# Patient Record
Sex: MALE | Race: WHITE | Employment: OTHER | URBAN - METROPOLITAN AREA
[De-identification: names, ages, dates, MRNs, and addresses within clinical notes are randomized per-mention and may not be internally consistent; named-entity substitution may affect disease eponyms.]

---

## 2017-01-13 ENCOUNTER — ANESTHESIA (OUTPATIENT)
Dept: ENDOSCOPY | Age: 77
End: 2017-01-13
Payer: MEDICARE

## 2017-01-13 ENCOUNTER — SURGERY (OUTPATIENT)
Age: 77
End: 2017-01-13

## 2017-01-13 ENCOUNTER — ANESTHESIA EVENT (OUTPATIENT)
Dept: ENDOSCOPY | Age: 77
End: 2017-01-13
Payer: MEDICARE

## 2017-01-13 ENCOUNTER — HOSPITAL ENCOUNTER (OUTPATIENT)
Age: 77
Setting detail: OUTPATIENT SURGERY
Discharge: HOME | End: 2017-01-13
Attending: INTERNAL MEDICINE | Admitting: INTERNAL MEDICINE
Payer: MEDICARE

## 2017-01-13 VITALS
DIASTOLIC BLOOD PRESSURE: 96 MMHG | SYSTOLIC BLOOD PRESSURE: 144 MMHG | OXYGEN SATURATION: 97 % | HEART RATE: 75 BPM | HEIGHT: 71 IN | TEMPERATURE: 97.6 F | BODY MASS INDEX: 27.58 KG/M2 | WEIGHT: 197 LBS | RESPIRATION RATE: 17 BRPM

## 2017-01-13 LAB
GLUCOSE BLD STRIP.AUTO-MCNC: 147 MG/DL (ref 65–100)
H PYLORI FROM TISSUE: NEGATIVE
KIT LOT NO., HCLOLOT: NORMAL
NEGATIVE CONTROL: NEGATIVE
POSITIVE CONTROL: POSITIVE
SERVICE CMNT-IMP: ABNORMAL

## 2017-01-13 PROCEDURE — 88305 TISSUE EXAM BY PATHOLOGIST: CPT | Performed by: INTERNAL MEDICINE

## 2017-01-13 PROCEDURE — 87077 CULTURE AEROBIC IDENTIFY: CPT | Performed by: INTERNAL MEDICINE

## 2017-01-13 PROCEDURE — 74011250636 HC RX REV CODE- 250/636: Performed by: INTERNAL MEDICINE

## 2017-01-13 PROCEDURE — 74011250636 HC RX REV CODE- 250/636

## 2017-01-13 PROCEDURE — 74011000250 HC RX REV CODE- 250

## 2017-01-13 PROCEDURE — 76060000031 HC ANESTHESIA FIRST 0.5 HR: Performed by: INTERNAL MEDICINE

## 2017-01-13 PROCEDURE — 82962 GLUCOSE BLOOD TEST: CPT

## 2017-01-13 PROCEDURE — 77030019988 HC FCPS ENDOSC DISP BSC -B: Performed by: INTERNAL MEDICINE

## 2017-01-13 PROCEDURE — 76040000019: Performed by: INTERNAL MEDICINE

## 2017-01-13 RX ORDER — LIDOCAINE HYDROCHLORIDE 20 MG/ML
INJECTION, SOLUTION EPIDURAL; INFILTRATION; INTRACAUDAL; PERINEURAL AS NEEDED
Status: DISCONTINUED | OUTPATIENT
Start: 2017-01-13 | End: 2017-01-13 | Stop reason: HOSPADM

## 2017-01-13 RX ORDER — SODIUM CHLORIDE 0.9 % (FLUSH) 0.9 %
5-10 SYRINGE (ML) INJECTION EVERY 8 HOURS
Status: DISCONTINUED | OUTPATIENT
Start: 2017-01-13 | End: 2017-01-13 | Stop reason: HOSPADM

## 2017-01-13 RX ORDER — SODIUM CHLORIDE 0.9 % (FLUSH) 0.9 %
5-10 SYRINGE (ML) INJECTION AS NEEDED
Status: DISCONTINUED | OUTPATIENT
Start: 2017-01-13 | End: 2017-01-13 | Stop reason: HOSPADM

## 2017-01-13 RX ORDER — SODIUM CHLORIDE 9 MG/ML
50 INJECTION, SOLUTION INTRAVENOUS CONTINUOUS
Status: DISCONTINUED | OUTPATIENT
Start: 2017-01-13 | End: 2017-01-13 | Stop reason: HOSPADM

## 2017-01-13 RX ORDER — GLYCOPYRROLATE 0.2 MG/ML
INJECTION INTRAMUSCULAR; INTRAVENOUS AS NEEDED
Status: DISCONTINUED | OUTPATIENT
Start: 2017-01-13 | End: 2017-01-13 | Stop reason: HOSPADM

## 2017-01-13 RX ORDER — ACETAMINOPHEN 325 MG/1
TABLET ORAL
COMMUNITY

## 2017-01-13 RX ORDER — PROPOFOL 10 MG/ML
INJECTION, EMULSION INTRAVENOUS AS NEEDED
Status: DISCONTINUED | OUTPATIENT
Start: 2017-01-13 | End: 2017-01-13 | Stop reason: HOSPADM

## 2017-01-13 RX ADMIN — SODIUM CHLORIDE 50 ML/HR: 900 INJECTION, SOLUTION INTRAVENOUS at 08:40

## 2017-01-13 RX ADMIN — GLYCOPYRROLATE 0.2 MG: 0.2 INJECTION INTRAMUSCULAR; INTRAVENOUS at 09:14

## 2017-01-13 RX ADMIN — LIDOCAINE HYDROCHLORIDE 40 MG: 20 INJECTION, SOLUTION EPIDURAL; INFILTRATION; INTRACAUDAL; PERINEURAL at 09:18

## 2017-01-13 RX ADMIN — PROPOFOL 160 MG: 10 INJECTION, EMULSION INTRAVENOUS at 09:18

## 2017-01-13 NOTE — H&P
Gastroenterology History and Physical    Patient: Oma Evans    Physician: Meri Barrett MD    Vital Signs: Blood pressure (!) 146/96, pulse 67, temperature 97.5 °F (36.4 °C), resp. rate 18, height 5' 10.5\" (1.791 m), weight 89.4 kg (197 lb), SpO2 99 %. Allergies: No Known Allergies    Indication: gerd    History:  Past Medical History   Diagnosis Date    Cancer ([address]      melanoma shoulder right,basal cell cancer nose    Diabetes (HCC)     GERD (gastroesophageal reflux disease)     HTN (hypertension)     Hyperlipemia     Ill-defined condition      insomnia    Sleep apnea      cpap      Past Surgical History   Procedure Laterality Date    Pr hand/finger surgery unlisted      Pr laser surgery of eye      Hx other surgical       melonama removed 6 years ago    Hx other surgical       basal skin cancer    Hx orthopaedic       trigger finger left      Social History     Social History    Marital status:      Spouse name: N/A    Number of children: N/A    Years of education: N/A     Social History Main Topics    Smoking status: Never Smoker    Smokeless tobacco: None    Alcohol use 1.8 oz/week     3 Glasses of wine per week      Comment: moderately    Drug use: No    Sexual activity: Not Asked     Other Topics Concern    None     Social History Narrative      Family History   Problem Relation Age of Onset   [place] [place] Cancer Mother     Diabetes Maternal Grandfather     Diabetes Sister     Kidney Disease Paternal Grandmother        Medications:   Prior to Admission medications    Medication Sig Start Date End Date Taking? Authorizing Provider   acetaminophen (TYLENOL) 325 mg tablet Take  by mouth every four (4) hours as needed for Pain.    Yes Historical Provider   metFORMIN (GLUCOPHAGE) 1,000 mg tablet TAKE 1 TABLET BY MOUTH TWICE A DAY WITH FOOD 12/24/16  Yes Candace Moncada MD   BYDUREON 2 mg/0.65 mL pnij INJECT 2MG EVERY 7 DAYS 11/25/16  Yes Chris Osman MD   LANTUS 100 unit/mL injection INJECT 36 UNITS SUBCUTANEOUSLY EVERY NIGHT  Patient taking differently: 36 units daily in am 10/30/16  Yes True Cruz MD   INVOKANA 100 mg tablet TAKE 1 TAB BY MOUTH DAILY (BEFORE BREAKFAST). 9/22/16  Yes Moon Nguyen MD   losartan (COZAAR) 50 mg tablet Take 1 Tab by mouth two (2) times a day. Patient taking differently: Take 50 mg by mouth daily. 9/22/16  Yes Stevie Monsalve MD   famotidine (PEPCID) 20 mg tablet Take 1 Tab by mouth two (2) times a day. 9/7/16  Yes True Cruz MD   rosuvastatin (CRESTOR) 10 mg tablet Take 1 Tab by mouth nightly. 7/14/16  Yes True Cruz MD   [facility] ULTRA TEST strip USE AS DIRECTED TWICE A DAY Dx: E11.9 4/25/16  Yes True Cruz MD   cyanocobalamin (VITAMIN B-12) 1,000 mcg tablet Take 1,000 mcg by mouth daily. Yes Historical Provider   MELATONIN, BULK, Take 3 mg by mouth nightly as needed. Yes Historical Provider   BD INSULIN SYRINGE ULTRA-FINE 1 mL 31 x 5/16\" syrg USING ONCE DAILY WITH LANTUS 10/6/14  Yes True Cruz MD   Insulin Needles, Disposable, (TITI PEN NEEDLE) 32 x 5/32 \" Ndle Twice daily 2/22/13  Yes True Cruz MD   aspirin 81 mg tablet Take 81 mg by mouth. Yes Historical Provider   zolpidem (AMBIEN) 5 mg tablet Take  by mouth nightly as needed. Historical Provider       Physical Exam:   HEENT: Head: Normocephalic, no lesions, without obvious abnormality.    Heart: regular rate and rhythm, S1, S2 normal, no murmur, click, rub or gallop   Lungs: chest clear, no wheezing, rales, normal symmetric air entry, Heart exam - S1, S2 normal, no murmur, no gallop, rate regular   Abdominal: Bowel sounds are normal, liver is not enlarged, spleen is not enlarged     Signed:  Ese Pulido MD 1/13/2017

## 2017-01-13 NOTE — ANESTHESIA PREPROCEDURE EVALUATION
Anesthetic History   No history of anesthetic complications            Review of Systems / Medical History  Patient summary reviewed, nursing notes reviewed and pertinent labs reviewed    Pulmonary        Sleep apnea: CPAP           Neuro/Psych   Within defined limits           Cardiovascular    Hypertension              Exercise tolerance: >4 METS     GI/Hepatic/Renal     GERD           Endo/Other    Diabetes    Cancer (skin)     Other Findings            Physical Exam    Airway  Mallampati: I  TM Distance: 4 - 6 cm  Neck ROM: normal range of motion   Mouth opening: Normal     Cardiovascular  Regular rate and rhythm,  S1 and S2 normal,  no murmur, click, rub, or gallop             Dental  No notable dental hx       Pulmonary  Breath sounds clear to auscultation               Abdominal  GI exam deferred       Other Findings            Anesthetic Plan    ASA: 3  Anesthesia type: MAC            Anesthetic plan and risks discussed with: Patient      Glucose 147

## 2017-01-13 NOTE — ANESTHESIA POSTPROCEDURE EVALUATION
Post-Anesthesia Evaluation and Assessment    Patient: Luis Miguel Carrera MRN: [id number]  SSN: [id number]    YOB: 1940  Age: 78 y.o. Sex: male       Cardiovascular Function/Vital Signs  Visit Vitals    BP (!) 144/96    Pulse 75    Temp 36.4 °C (97.6 °F)    Resp 17    Ht 5' 10.5\" (1.791 m)    Wt 89.4 kg (197 lb)    SpO2 97%    BMI 27.87 kg/m2       Patient is status post MAC anesthesia for Procedure(s):  ESOPHAGOGASTRODUODENOSCOPY (EGD)  ESOPHAGOGASTRODUODENAL (EGD) BIOPSY. Nausea/Vomiting: None    Postoperative hydration reviewed and adequate. Pain:  Pain Scale 1: Numeric (0 - 10) (01/13/17 0954)  Pain Intensity 1: 0 (01/13/17 0954)   Managed    Neurological Status: At baseline    Mental Status and Level of Consciousness: Arousable    Pulmonary Status:   O2 Device: Room air (01/13/17 0954)   Adequate oxygenation and airway patent    Complications related to anesthesia: None    Post-anesthesia assessment completed.  No concerns    Signed By: oJn Arango MD     January 13, 2017

## 2017-01-13 NOTE — DISCHARGE INSTRUCTIONS
Oma Evans  [id number]  1940    EGD DISCHARGE INSTRUCTIONS  Discomfort:  Sore throat- throat lozenges or warm salt water gargle  redness at IV site- apply warm compress to area; if redness or soreness persist- contact your physician  Gaseous discomfort- walking, belching will help relieve any discomfort  You may not operate a vehicle for 12 hours  You may not engage in an occupation involving machinery or appliances for rest of today  You may not drink alcoholic beverages for at least 12 hours  Avoid making any critical decisions for at least 24 hour  DIET  You may resume your regular diet    ACTIVITY  Spend the remainder of the day resting -  avoid any strenuous activity. CALL M.D. ANY SIGN OF   Increasing pain, nausea, vomiting  Abdominal distension (swelling)  New increased bleeding (oral or rectal)  Fever (chills)  Pain in chest area  Bloody discharge from nose or mouth  Shortness of breath    Follow-up Instructions:   Call Gage Quiros  Biopsy results in 10 days  Telephone # [phone]  Brief Findings:   normal       DISCHARGE SUMMARY from Nurse    The following personal items collected during your admission are returned to you:   Dental Appliance: Dental Appliances: None  Vision: Visual Aid: None  Hearing Aid:    Jewelry:    Clothing:    Other Valuables:    Valuables sent to safe: [employer] Activation    Thank you for requesting access to Genticel. Please follow the instructions below to securely access and download your online medical record. Genticel allows you to send messages to your doctor, view your test results, renew your prescriptions, schedule appointments, and more. How Do I Sign Up? 1. In your internet browser, go to [email or web address]  2. Click on the First Time User? Click Here link in the Sign In box. You will be redirect to the New Member Sign Up page. 3. Enter your Genticel Access Code exactly as it appears below.  You will not need to use this code after youve completed the sign-up process. If you do not sign up before the expiration date, you must request a new code. Bitbar Access Code: Activation code not generated  Current Bitbar Status: Active (This is the date your Bitbar access code will )    4. Enter the last four digits of your Social Security Number (xxxx) and Date of Birth (mm/dd/yyyy) as indicated and click Submit. You will be taken to the next sign-up page. 5. Create a Digitelt ID. This will be your Bitbar login ID and cannot be changed, so think of one that is secure and easy to remember. 6. Create a Bitbar password. You can change your password at any time. 7. Enter your Password Reset Question and Answer. This can be used at a later time if you forget your password. 8. Enter your e-mail address. You will receive e-mail notification when new information is available in [address] 9. Click Sign Up. You can now view and download portions of your medical record. 10. Click the Download Summary menu link to download a portable copy of your medical information. Additional Information    If you have questions, please visit the Frequently Asked Questions section of the Bitbar website at [email or web address] Remember, Bitbar is NOT to be used for urgent needs. For medical emergencies, dial 911.

## 2017-01-13 NOTE — IP AVS SNAPSHOT
[address] [facility] 
[phone] Patient: Diane Guillen MRN: [id number] HC You are allergic to the following No active allergies Recent Documentation Height Weight BMI Smoking Status 1.791 m 89.4 kg 27.87 kg/m2 Never Smoker Emergency Contacts Name Discharge Info Relation Home Work Mobile Elicia Jay  Spouse [3] [phone] About your hospitalization You were admitted on:  2017 You last received care in the:  [facility] ENDOSCOPY You were discharged on:  2017 Unit phone number:  [phone] Why you were hospitalized Your primary diagnosis was:  Not on File Providers Seen During Your Hospitalizations Provider Role Specialty Primary office phone Zach Rodriguez MD Attending Provider Gastroenterology [phone] Your Primary Care Physician (PCP) Primary Care Physician Office Phone Office Fax Molly Herrera [phone] Follow-up Information None Current Discharge Medication List  
  
CONTINUE these medications which have CHANGED Dose & Instructions Dispensing Information Comments Morning Noon Evening Bedtime LANTUS 100 unit/mL injection Generic drug:  insulin glargine What changed:  See the new instructions. Your next dose is: Today, Tomorrow Other:  _________ INJECT 36 UNITS SUBCUTANEOUSLY EVERY NIGHT Quantity:  20 mL Refills:  11  
     
   
   
   
  
 losartan 50 mg tablet Commonly known as:  COZAAR What changed:  when to take this Your next dose is: Today, Tomorrow Other:  _________ Dose:  50 mg Take 1 Tab by mouth two (2) times a day. Quantity:  60 Tab Refills:  10  
     
   
   
   
  
  
CONTINUE these medications which have NOT CHANGED Dose & Instructions Dispensing Information Comments Morning Noon Evening Bedtime  
 aspirin 81 mg tablet Your next dose is: Today, Tomorrow Other:  _________ Dose:  81 mg Take 81 mg by mouth. Refills:  0 BD INSULIN SYRINGE ULTRA-FINE 1 mL 31 gauge x 5/16 Syrg Generic drug:  Insulin Syringe-Needle U-100 Your next dose is: Today, Tomorrow Other:  _________ USING ONCE DAILY WITH LANTUS Quantity:  100 Syringe Refills:  4 BYDUREON 2 mg/0.65 mL Pnij Generic drug:  exenatide microspheres Your next dose is: Today, Tomorrow Other:  _________ INJECT 2MG EVERY 7 DAYS Quantity:  4 mL Refills:  11  
     
   
   
   
  
 famotidine 20 mg tablet Commonly known as:  PEPCID Your next dose is: Today, Tomorrow Other:  _________ Dose:  20 mg Take 1 Tab by mouth two (2) times a day. Quantity:  60 Tab Refills:  5 Insulin Needles (Disposable) 32 gauge x 5/32\" Ndle Commonly known as:  Silvia Pen Needle Your next dose is: Today, Tomorrow Other:  _________ Twice daily Quantity:  100 Each Refills:  6 INVOKANA 100 mg tablet Generic drug:  canagliflozin Your next dose is: Today, Tomorrow Other:  _________ TAKE 1 TAB BY MOUTH DAILY (BEFORE BREAKFAST). Quantity:  30 Tab Refills:  10 MELATONIN (BULK) Your next dose is: Today, Tomorrow Other:  _________ Dose:  3 mg Take 3 mg by mouth nightly as needed. Refills:  0  
     
   
   
   
  
 metFORMIN 1,000 mg tablet Commonly known as:  GLUCOPHAGE Your next dose is: Today, Tomorrow Other:  _________ TAKE 1 TABLET BY MOUTH TWICE A DAY WITH FOOD Quantity:  180 Tab Refills:  3  
     
   
   
   
  
 ONETOUCH ULTRA TEST strip Generic drug:  glucose blood VI test strips Your next dose is: Today, Tomorrow Other:  _________ USE AS DIRECTED TWICE A DAY Dx: E11.9 Quantity:  100 Strip Refills:  10  
     
   
   
   
  
 rosuvastatin 10 mg tablet Commonly known as:  CRESTOR Your next dose is: Today, Tomorrow Other:  _________ Dose:  10 mg Take 1 Tab by mouth nightly. Quantity:  30 Tab Refills:  10 TYLENOL 325 mg tablet Generic drug:  acetaminophen Your next dose is: Today, Tomorrow Other:  _________ Take  by mouth every four (4) hours as needed for Pain. Refills:  0  
     
   
   
   
  
 VITAMIN B-12 1,000 mcg tablet Generic drug:  cyanocobalamin Your next dose is: Today, Tomorrow Other:  _________ Dose:  1000 mcg Take 1,000 mcg by mouth daily. Refills:  0  
     
   
   
   
  
 zolpidem 5 mg tablet Commonly known as:  AMBIEN Your next dose is: Today, Tomorrow Other:  _________ Take  by mouth nightly as needed. Refills:  0 Discharge Instructions Jarome Notice [id number] 
1940 EGD DISCHARGE INSTRUCTIONS Discomfort: 
Sore throat- throat lozenges or warm salt water gargle 
redness at IV site- apply warm compress to area; if redness or soreness persist- contact your physician Gaseous discomfort- walking, belching will help relieve any discomfort You may not operate a vehicle for 12 hours You may not engage in an occupation involving machinery or appliances for rest of today You may not drink alcoholic beverages for at least 12 hours Avoid making any critical decisions for at least 24 hour DIET You may resume your regular diet ACTIVITY Spend the remainder of the day resting -  avoid any strenuous activity. CALL M.D. ANY SIGN OF Increasing pain, nausea, vomiting Abdominal distension (swelling) New increased bleeding (oral or rectal) Fever (chills) Pain in chest area Bloody discharge from nose or mouth Shortness of breath Follow-up Instructions: 
 Call Nikki Mo Biopsy results in 10 days Telephone # [phone] Brief Findings:   normal 
 
  
DISCHARGE SUMMARY from Nurse The following personal items collected during your admission are returned to you:  
Dental Appliance: Dental Appliances: None Vision: Visual Aid: None Hearing Aid:   
Jewelry:   
Clothing:   
Other Valuables:   
Valuables sent to safe: [employer] Activation Thank you for requesting access to Seer. Please follow the instructions below to securely access and download your online medical record. Seer allows you to send messages to your doctor, view your test results, renew your prescriptions, schedule appointments, and more. How Do I Sign Up? 1. In your internet browser, go to [email or web address] 
2. Click on the First Time User? Click Here link in the Sign In box. You will be redirect to the New Member Sign Up page. 3. Enter your Seer Access Code exactly as it appears below. You will not need to use this code after youve completed the sign-up process. If you do not sign up before the expiration date, you must request a new code. Seer Access Code: Activation code not generated Current Seer Status: Active (This is the date your Seer access code will ) 4. Enter the last four digits of your Social Security Number (xxxx) and Date of Birth (mm/dd/yyyy) as indicated and click Submit. You will be taken to the next sign-up page. 5. Create a Seer ID. This will be your Seer login ID and cannot be changed, so think of one that is secure and easy to remember. 6. Create a Seer password. You can change your password at any time. 7. Enter your Password Reset Question and Answer. This can be used at a later time if you forget your password. 8. Enter your e-mail address. You will receive e-mail notification when new information is available in [address] 9. Click Sign Up. You can now view and download portions of your medical record. 10. Click the Download Summary menu link to download a portable copy of your medical information. Additional Information If you have questions, please visit the Frequently Asked Questions section of the myeasydocs website at [email or web address] Remember, myeasydocs is NOT to be used for urgent needs. For medical emergencies, dial 911. Discharge Orders None [facility] SERVICES! Dear Aaron Covarrubias: 
Thank you for requesting a myeasydocs account. Our records indicate that you already have an active myeasydocs account. You can access your account anytime at [email or web address] Did you know that you can access your hospital and ER discharge instructions at any time in myeasydocs? You can also review all of your test results from your hospital stay or ER visit. Additional Information If you have questions, please visit the Frequently Asked Questions section of the myeasydocs website at [email or web address] Remember, myeasydocs is NOT to be used for urgent needs. For medical emergencies, dial 911. Now available from your iPhone and Android! General Information Please provide this summary of care documentation to your next provider. Patient Signature:  ____________________________________________________________ Date:  ____________________________________________________________  
  
Abbi Sheth Provider Signature:  ____________________________________________________________ Date:  ____________________________________________________________

## 2017-01-13 NOTE — PERIOP NOTES
Rapid H Pylori obtained. Anesthesia reports 160mg Propofol, 40mg Lidocaine, 0.2 mg Robinul and 200mL NS given during procedure. Received report from anesthesia staff on vital signs and status of patient.

## 2017-01-13 NOTE — PROGRESS NOTES
Monica Vieirai  1940  [id number]    Situation:  Verbal report received from: Agueda Chopra RN  Procedure: Procedure(s):  ESOPHAGOGASTRODUODENOSCOPY (EGD)  ESOPHAGOGASTRODUODENAL (EGD) BIOPSY    Background:    Preoperative diagnosis: ESOPHAGEAL REFLUX  Postoperative diagnosis: normal egd exam    :  Dr. Castillo Soto  Assistant(s): Endoscopy Technician-1: Candy Red  Endoscopy RN-1: Jarrell Cespedes    Specimens:   ID Type Source Tests Collected by Time Destination   1 : distal esophagus biopsy for pathology Preservative Esophagus, Distal  Jaqueline Chavarria MD 1/13/2017 1374 Pathology     H. Pylori  yes    Assessment:  Intra-procedure medications       Anesthesia gave intra-procedure sedation and medications, see anesthesia flow sheet yes    Intravenous fluids: NS@ KVO     Vital signs stable       Abdominal assessment: round and soft       Recommendation:  Discharge patient per MD order  .     Family or Friend  Pat Wife  Permission to share finding with family or friend yes

## 2017-01-13 NOTE — PROCEDURES
Endoscopic Gastroduodenoscopy Procedure Note  Otilia Shannon  1940  [id number]      Procedure: Endoscopic Gastroduodenoscopy with biopsy, ANYI test    Indication:  GERD    Pre-operative Diagnosis: see indication above    Post-operative Diagnosis: see findings below    :  Puma Kearney MD    Referring Provider:  crystal    Anethesia/Sedation:  MAC anesthesia Propofol    Pre-Procedural Exam:      Airway: clear, Malimpati 2   Heart: RRR, without gallops or rubs  Lungs: clear bilaterally without wheezes, crackles, or rhonchi  Abdomen: soft, nontender, nondistended, bowel sounds present        Procedure Details     After infom consent was obtained for the procedure, with all risks and benefits of procedure explained the patient was taken to the endoscopy suite and placed in the left lateral decubitus position. Following sequential administration of sedation as per above, the [id number] gastroscope was inserted into the mouth and advanced under direct vision to second portion of the duodenum. A careful inspection was made as the gastroscope was withdrawn, including a retroflexed view of the proximal stomach; findings and interventions are described below. Findings/Impression: ESOPHAGUS: The esophagus is normal. The proximal, mid, and distal portions are normal. The Z-Line is intact. STOMACH: The fundus on antegrade and retroflex views is normal. The body, antrum, and pylorus are normal.   DUODENUM: The bulb and second portions are normal.    Therapies:  biopsy of esophagus    Specimens: anyi and distal esophagus          Complications:   None; patient tolerated the procedure well. EBL:  None. Recommendations:  -Continue acid suppression. , -Await pathology. , -Await ANYI test result and treat for Helicobacter pylori if positive. , -Follow up with primary care physician, -no Barretts so can use H2B or PPI for symptom control    Discharge Disposition:

## 2017-02-23 LAB
ALBUMIN SERPL-MCNC: 4 G/DL (ref 3.5–4.8)
ALBUMIN/CREAT UR: 41.2 MG/G CREAT (ref 0–30)
ALBUMIN/GLOB SERPL: 1.4 {RATIO} (ref 1.1–2.5)
ALP SERPL-CCNC: 52 IU/L (ref 39–117)
ALT SERPL-CCNC: 15 IU/L (ref 0–44)
AST SERPL-CCNC: 13 IU/L (ref 0–40)
BILIRUB SERPL-MCNC: 0.4 MG/DL (ref 0–1.2)
BUN SERPL-MCNC: 34 MG/DL (ref 8–27)
BUN/CREAT SERPL: 26 (ref 10–22)
CALCIUM SERPL-MCNC: 10 MG/DL (ref 8.6–10.2)
CHLORIDE SERPL-SCNC: 99 MMOL/L (ref 96–106)
CHOLEST SERPL-MCNC: 151 MG/DL (ref 100–199)
CO2 SERPL-SCNC: 23 MMOL/L (ref 18–29)
CREAT SERPL-MCNC: 1.32 MG/DL (ref 0.76–1.27)
CREAT UR-MCNC: 63.9 MG/DL
EST. AVERAGE GLUCOSE BLD GHB EST-MCNC: 180 MG/DL
GLOBULIN SER CALC-MCNC: 2.8 G/DL (ref 1.5–4.5)
GLUCOSE SERPL-MCNC: 140 MG/DL (ref 65–99)
HBA1C MFR BLD: 7.9 % (ref 4.8–5.6)
HDLC SERPL-MCNC: 38 MG/DL
INTERPRETATION, 910389: NORMAL
INTERPRETATION: NORMAL
LDLC SERPL CALC-MCNC: 52 MG/DL (ref 0–99)
Lab: NORMAL
MICROALBUMIN UR-MCNC: 26.3 UG/ML
PDF IMAGE, 910387: NORMAL
POTASSIUM SERPL-SCNC: 4 MMOL/L (ref 3.5–5.2)
PROT SERPL-MCNC: 6.8 G/DL (ref 6–8.5)
SODIUM SERPL-SCNC: 141 MMOL/L (ref 134–144)
TRIGL SERPL-MCNC: 305 MG/DL (ref 0–149)
VLDLC SERPL CALC-MCNC: 61 MG/DL (ref 5–40)

## 2017-03-01 ENCOUNTER — OFFICE VISIT (OUTPATIENT)
Dept: ENDOCRINOLOGY | Age: 77
End: 2017-03-01

## 2017-03-01 VITALS
DIASTOLIC BLOOD PRESSURE: 78 MMHG | HEIGHT: 71 IN | WEIGHT: 195 LBS | BODY MASS INDEX: 27.3 KG/M2 | SYSTOLIC BLOOD PRESSURE: 125 MMHG | HEART RATE: 80 BPM

## 2017-03-01 DIAGNOSIS — I10 ESSENTIAL HYPERTENSION: ICD-10-CM

## 2017-03-01 DIAGNOSIS — Z79.4 TYPE 2 DIABETES MELLITUS WITH COMPLICATION, WITH LONG-TERM CURRENT USE OF INSULIN (HCC): Primary | ICD-10-CM

## 2017-03-01 DIAGNOSIS — E78.5 DYSLIPIDEMIA: ICD-10-CM

## 2017-03-01 DIAGNOSIS — E11.8 TYPE 2 DIABETES MELLITUS WITH COMPLICATION, WITH LONG-TERM CURRENT USE OF INSULIN (HCC): Primary | ICD-10-CM

## 2017-03-01 DIAGNOSIS — R53.83 FATIGUE, UNSPECIFIED TYPE: ICD-10-CM

## 2017-03-01 NOTE — PATIENT INSTRUCTIONS
Diabetes:  Continue efforts with weight loss  Continue Bydureon  Continue Invokana 100 mg  Continue metformin - Take 1000 mg twice daily  Continue Lantus to 35 units - follow trends from bedtime to fasting to assess. Goals for blood sugars:  ** focus on checking bedtime and fasting sugars  Fasting  (less than 150)  Other times -  (less than 180). Cholesterol   continue rosuvastatin - 10 mg - continue taking 1/2 tablet    Blood pressure:  Losartan 50 mg - at bedtime    Fatigue:  Recommend regular exercise  May be related to frequent use of steroid over last 3 months. Hopefully this will improve over next few months.

## 2017-03-01 NOTE — PROGRESS NOTES
History of Present Illness: Monica Roberts is a 78 y.o. male presents for follow-up of diabetes. He also has HTN and dyslipidemia and JOSEPH  A1c 7.7 with pre-labs. Received 3 courses of oral steroids in last 3 months - prednisone x 2 or dexamethasone. Taking these for left neck/shoulder problems. Following with Dr Nicole George for this    Current diabetes regimen:  Metformin 1 g BID   Lantus: 35 units - taking up to 36 units  Bydureon weekly - started 9/2013  Invokana 100 mg daily    Glucoses:  Brings log - fasting values generally 100-130. Other times 130-180. ** however, glucoses in 200s fasting and as high as 400 in evening while on steroids. No lows. Diet:   Generally watching carb intake and trying to keep this low. Exercise - feels very fatigued after raking for several hours or doing other activity. Walking less. These sx only present for last few weeks, perhaps since stopping last round of steroids  Feels he is sleeping better. Still using mask for JOSEPH. Takes off after 4 hours. Wt - down 15 lbs in last year or so. Lipids:  Rosuvastatin 5 mg daily (changed from atorvastatin)    Blood pressure:  Losartan 50 mg - taking at bedtime. 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     metFORMIN (GLUCOPHAGE) 1,000 mg tablet TAKE 1 TABLET BY MOUTH TWICE A DAY WITH FOOD    BYDUREON 2 mg/0.65 mL pnij INJECT 2MG EVERY 7 DAYS    LANTUS 100 unit/mL injection INJECT 36 UNITS SUBCUTANEOUSLY EVERY NIGHT (Patient taking differently: 36 units daily in am)    INVOKANA 100 mg tablet TAKE 1 TAB BY MOUTH DAILY (BEFORE BREAKFAST).  losartan (COZAAR) 50 mg tablet Take 1 Tab by mouth two (2) times a day. (Patient taking differently: Take 50 mg by mouth daily.)    famotidine (PEPCID) 20 mg tablet Take 1 Tab by mouth two (2) times a day.  rosuvastatin (CRESTOR) 10 mg tablet Take 1 Tab by mouth nightly.  ONETOUCH ULTRA TEST strip USE AS DIRECTED TWICE A DAY Dx: E11.9    cyanocobalamin (VITAMIN B-12) 1,000 mcg tablet Take 1,000 mcg by mouth daily.  MELATONIN, BULK, Take 3 mg by mouth nightly as needed.  BD INSULIN SYRINGE ULTRA-FINE 1 mL 31 x 5/16\" syrg USING ONCE DAILY WITH LANTUS    Insulin Needles, Disposable, (TITI PEN NEEDLE) 32 x 5/32 \" Ndle Twice daily    aspirin 81 mg tablet Take 81 mg by mouth.  acetaminophen (TYLENOL) 325 mg tablet Take  by mouth every four (4) hours as needed for Pain.  zolpidem (AMBIEN) 5 mg tablet Take  by mouth nightly as needed. No current facility-administered medications for this visit. No Known Allergies    Review of Systems:  - Eyes: no blurry vision or double vision  - Cardiovascular: no chest pain.  Climbs stairs in house without difficults  - Respiratory: no shortness of breath  - Musculoskeletal: see HPI  - Neurological: no numbness/tingling in extremities    Physical Examination:  Visit Vitals    /78    Pulse 80    Ht 5' 10.5\" (1.791 m)    Wt 195 lb (88.5 kg)    BMI 27.58 kg/m2   -   - General: pleasant, no distress, normal gait   HEENT: hearing intact, EOMI, clear sclera without icterus  - Cardiovascular: regular, normal rate   - Respiratory: normal effort  - Integumentary: no edema  - Psychiatric: normal mood and affect    Data Reviewed:   Component      Latest Ref Rng & Units 2/22/2017 2/22/2017 2/22/2017 2/22/2017          11:03 AM 11:03 AM 11:03 AM 11:03 AM   Glucose      65 - 99 mg/dL    140 (H)   BUN      8 - 27 mg/dL    34 (H)   Creatinine      0.76 - 1.27 mg/dL    1.32 (H)   GFR est non-AA      >59 mL/min/1.73    53 (L)   GFR est AA >59 mL/min/1.73    61   BUN/Creatinine ratio      10 - 22    26 (H)   Sodium      134 - 144 mmol/L    141   Potassium      3.5 - 5.2 mmol/L    4.0   Chloride      96 - 106 mmol/L    99   CO2      18 - 29 mmol/L    23   Calcium      8.6 - 10.2 mg/dL    10.0   Protein, total      6.0 - 8.5 g/dL    6.8   Albumin      3.5 - 4.8 g/dL    4.0   GLOBULIN, TOTAL      1.5 - 4.5 g/dL    2.8   A-G Ratio      1.1 - 2.5    1.4   Bilirubin, total      0.0 - 1.2 mg/dL    0.4   Alk. phosphatase      39 - 117 IU/L    52   AST      0 - 40 IU/L    13   ALT      0 - 44 IU/L    15   Cholesterol, total      100 - 199 mg/dL   151    Triglyceride      0 - 149 mg/dL   305 (H)    HDL Cholesterol      >39 mg/dL   38 (L)    VLDL, calculated      5 - 40 mg/dL   61 (H)    LDL, calculated      0 - 99 mg/dL   52    Creatinine, urine      Not Estab. mg/dL  63.9     Microalbumin, urine      Not Estab. ug/mL  26.3     Microalbumin/Creat. Ratio      0.0 - 30.0 mg/g creat  41.2 (H)     Hemoglobin A1c, (calculated)      4.8 - 5.6 % 7.9 (H)      Estimated average glucose      mg/dL 180        Assessment/Plan:   1. Type 2 diabetes mellitus with complication, with long-term current use of insulin (HCC)   - exacerbated by steroids. Suspect A1c would be 7 +/- if off of them. Control is very good, but glucoses were very high when on steroids  - continue Lantus 35 units, Bydureon, Invokana, metformin  - discussed using NPH should he need prednisone in future again   2. Essential hypertension - continue losartan and wt loss   3. Dyslipidemia - continue rosuvastatin 5 mg. Weight loss, exercise, avoiding steroid   4. BMI 27.0-27.9,adult   - reviewed effect on energy ,sleep, lipids, diabetes. 5. Fatigue, unspecified type   - may be due to hopefully brief relative adrenal insufficiency after frequent steroid use.  Hopeful this will improve  - if sx do not improve, will repeat thyroid tests (normal in fall) ,check testosterone levels, and consider cardiac evaluation. Patient Instructions   Diabetes:  Continue efforts with weight loss  Continue Bydureon  Continue Invokana 100 mg  Continue metformin - Take 1000 mg twice daily  Continue Lantus to 35 units - follow trends from bedtime to fasting to assess. Goals for blood sugars:  ** focus on checking bedtime and fasting sugars  Fasting  (less than 150)  Other times -  (less than 180). Cholesterol   continue rosuvastatin - 10 mg - continue taking 1/2 tablet    Blood pressure:  Losartan 50 mg - at bedtime    Fatigue:  Recommend regular exercise  May be related to frequent use of steroid over last 3 months. Hopefully this will improve over next few months. Follow-up Disposition:  Return in about 3 months (around 6/1/2017).     Copy sent to:

## 2017-03-01 NOTE — MR AVS SNAPSHOT
Visit Information Date & Time Provider Department Dept. Phone Encounter #  
 3/1/2017  9:10 AM Demetrius West, [address] [place] [address] Diabetes and Endocrinology [phone] [id number] Follow-up Instructions Return in about 3 months (around 6/1/2017). Follow-up and Disposition History Upcoming Health Maintenance Date Due DTaP/Tdap/Td series (1 - Tdap) 9/5/1963 FOBT Q 1 YEAR AGE 50-75 9/5/1992 ZOSTER VACCINE AGE 60> 9/5/2002 GLAUCOMA SCREENING Q2Y 9/5/2007 Pneumococcal 65+ Low/Medium Risk (1 of 2 - PCV13) 9/5/2007 MEDICARE YEARLY EXAM 9/5/2007 EYE EXAM RETINAL OR DILATED Q1 11/12/2015 INFLUENZA AGE 9 TO ADULT 8/1/2016 FOOT EXAM Q1 3/24/2017 HEMOGLOBIN A1C Q6M 8/22/2017 MICROALBUMIN Q1 2/22/2018 LIPID PANEL Q1 2/22/2018 Allergies as of 3/1/2017  Review Complete On: 3/1/2017 By: Demetrius West MD  
 No Known Allergies Current Immunizations  Never Reviewed No immunizations on file. Not reviewed this visit You Were Diagnosed With   
  
 Codes Comments Type 2 diabetes mellitus with complication, with long-term current use of insulin (HCC)    -  Primary ICD-10-CM: E11.8, Z79.4 ICD-9-CM: 250.90, V58.67 Essential hypertension     ICD-10-CM: I10 
ICD-9-CM: 401.9 Dyslipidemia     ICD-10-CM: E78.5 ICD-9-CM: 272.4 BMI 27.0-27.9,adult     ICD-10-CM: B76.85 ICD-9-CM: V85.23 Fatigue, unspecified type     ICD-10-CM: R53.83 ICD-9-CM: 780.79 Vitals BP  
  
  
  
  
  
 125/78 Vitals History BMI and BSA Data Body Mass Index Body Surface Area  
 27.58 kg/m 2 2.1 m 2 Preferred Pharmacy Pharmacy Name Phone [id number] - [place], [address] [phone] Your Updated Medication List  
  
   
This list is accurate as of: 3/1/17 10:10 AM.  Always use your most recent med list.  
  
  
  
  
 aspirin 81 mg tablet Take 81 mg by mouth. BD INSULIN SYRINGE ULTRA-FINE 1 mL 31 gauge x 5/16 Syrg Generic drug:  Insulin Syringe-Needle U-100 USING ONCE DAILY WITH LANTUS BYDUREON 2 mg/0.65 mL Pnij Generic drug:  exenatide microspheres INJECT 2MG EVERY 7 DAYS  
  
 CELEBREX PO Take  by mouth. famotidine 20 mg tablet Commonly known as:  PEPCID Take 1 Tab by mouth two (2) times a day. Insulin Needles (Disposable) 32 gauge x 5/32\" Ndle Commonly known as:  Silvia Pen Needle Twice daily INVOKANA 100 mg tablet Generic drug:  canagliflozin TAKE 1 TAB BY MOUTH DAILY (BEFORE BREAKFAST). LANTUS 100 unit/mL injection Generic drug:  insulin glargine INJECT 36 UNITS SUBCUTANEOUSLY EVERY NIGHT  
  
 losartan 50 mg tablet Commonly known as:  COZAAR Take 1 Tab by mouth two (2) times a day. MELATONIN (BULK) Take 3 mg by mouth nightly as needed. metFORMIN 1,000 mg tablet Commonly known as:  GLUCOPHAGE  
TAKE 1 TABLET BY MOUTH TWICE A DAY WITH FOOD  
  
 ONETOUCH ULTRA TEST strip Generic drug:  glucose blood VI test strips USE AS DIRECTED TWICE A DAY Dx: E11.9  
  
 rosuvastatin 10 mg tablet Commonly known as:  CRESTOR Take 1 Tab by mouth nightly. TYLENOL 325 mg tablet Generic drug:  acetaminophen Take  by mouth every four (4) hours as needed for Pain. VITAMIN B-12 1,000 mcg tablet Generic drug:  cyanocobalamin Take 1,000 mcg by mouth daily. We Performed the Following HEMOGLOBIN A1C WITH EAG [37131 CPT(R)] LIPID PANEL [65355 CPT(R)] METABOLIC PANEL, BASIC [04576 CPT(R)] MICROALBUMIN, UR, RAND W/ MICROALBUMIN/CREA RATIO [id number] CPT(R)] Follow-up Instructions Return in about 3 months (around 6/1/2017). Patient Instructions Diabetes: 
Continue efforts with weight loss Continue Bydureon Continue Invokana 100 mg 
Continue metformin - Take 1000 mg twice daily Continue Lantus to 35 units - follow trends from bedtime to fasting to assess. Goals for blood sugars: 
** focus on checking bedtime and fasting sugars Fasting  (less than 150) Other times -  (less than 180). Cholesterol  
continue rosuvastatin - 10 mg - continue taking 1/2 tablet Blood pressure: 
Losartan 50 mg - at bedtime Fatigue: 
Recommend regular exercise May be related to frequent use of steroid over last 3 months. Hopefully this will improve over next few months. Introducing [facility]! Dear Marisol Miguel: 
Thank you for requesting a Kuapay account. Our records indicate that you already have an active Kuapay account. You can access your account anytime at [email or web address] Did you know that you can access your hospital and ER discharge instructions at any time in Kuapay? You can also review all of your test results from your hospital stay or ER visit. Additional Information If you have questions, please visit the Frequently Asked Questions section of the Kuap[employer] website at [email or web address] Remember, Kuapay is NOT to be used for urgent needs. For medical emergencies, dial 911. Now available from your iPhone and Android! Please provide this summary of care documentation to your next provider. Your primary care clinician is listed as Rehana Anders. If you have any questions after today's visit, please call [phone].

## 2017-03-02 DIAGNOSIS — Z79.4 TYPE 2 DIABETES MELLITUS WITH COMPLICATION, WITH LONG-TERM CURRENT USE OF INSULIN (HCC): ICD-10-CM

## 2017-03-02 DIAGNOSIS — E11.8 TYPE 2 DIABETES MELLITUS WITH COMPLICATION, WITH LONG-TERM CURRENT USE OF INSULIN (HCC): ICD-10-CM

## 2017-03-02 DIAGNOSIS — E78.5 DYSLIPIDEMIA: ICD-10-CM

## 2017-03-08 RX ORDER — FAMOTIDINE 20 MG/1
TABLET, FILM COATED ORAL
Qty: 60 TAB | Refills: 5 | Status: SHIPPED | OUTPATIENT
Start: 2017-03-08 | End: 2017-09-04 | Stop reason: SDUPTHER

## 2017-03-24 ENCOUNTER — HOSPITAL ENCOUNTER (EMERGENCY)
Age: 77
Discharge: HOME | End: 2017-03-24
Attending: FAMILY MEDICINE

## 2017-03-24 ENCOUNTER — APPOINTMENT (OUTPATIENT)
Dept: GENERAL RADIOLOGY | Age: 77
End: 2017-03-24
Attending: NURSE PRACTITIONER

## 2017-03-24 VITALS
RESPIRATION RATE: 16 BRPM | SYSTOLIC BLOOD PRESSURE: 126 MMHG | BODY MASS INDEX: 27.92 KG/M2 | WEIGHT: 195 LBS | OXYGEN SATURATION: 99 % | TEMPERATURE: 97.3 F | DIASTOLIC BLOOD PRESSURE: 75 MMHG | HEIGHT: 70 IN | HEART RATE: 89 BPM

## 2017-03-24 DIAGNOSIS — J10.1 INFLUENZA B: Primary | ICD-10-CM

## 2017-03-24 LAB
INFLUENZA A AG (POC): NEGATIVE
INFLUENZA AG (POC) NEGATIVE CTRL.: ABNORMAL
INFLUENZA AG (POC) POSITIVE CTRL.: ABNORMAL
INFLUENZA B AG (POC): POSITIVE
LOT NUMBER POC: ABNORMAL

## 2017-03-24 RX ORDER — BENZONATATE 200 MG/1
200 CAPSULE ORAL
Qty: 30 CAP | Refills: 0 | Status: SHIPPED | OUTPATIENT
Start: 2017-03-24 | End: 2017-06-21

## 2017-03-24 RX ORDER — OSELTAMIVIR PHOSPHATE 75 MG/1
75 CAPSULE ORAL 2 TIMES DAILY
Qty: 10 CAP | Refills: 0 | Status: SHIPPED | OUTPATIENT
Start: 2017-03-24 | End: 2017-03-29

## 2017-03-24 NOTE — DISCHARGE INSTRUCTIONS
Influenza (Flu): Care Instructions  Your Care Instructions  Influenza (flu) is an infection in the lungs and breathing passages. It is caused by the influenza virus. There are different strains, or types, of the flu virus from year to year. Unlike the common cold, the flu comes on suddenly and the symptoms, such as a cough, congestion, fever, chills, fatigue, aches, and pains, are more severe. These symptoms may last up to 10 days. Although the flu can make you feel very sick, it usually doesn't cause serious health problems. Home treatment is usually all you need for flu symptoms. But your doctor may prescribe antiviral medicine to prevent other health problems, such as pneumonia, from developing. Older people and those who have a long-term health condition, such as lung disease, are most at risk for having pneumonia or other health problems. Follow-up care is a key part of your treatment and safety. Be sure to make and go to all appointments, and call your doctor if you are having problems. Its also a good idea to know your test results and keep a list of the medicines you take. How can you care for yourself at home? · Get plenty of rest.  · Drink plenty of fluids, enough so that your urine is light yellow or clear like water. If you have kidney, heart, or liver disease and have to limit fluids, talk with your doctor before you increase the amount of fluids you drink. · Take an over-the-counter pain medicine if needed, such as acetaminophen (Tylenol), ibuprofen (Advil, Motrin), or naproxen (Aleve), to relieve fever, headache, and muscle aches. Read and follow all instructions on the label. No one younger than 20 should take aspirin. It has been linked to Reye syndrome, a serious illness. · Do not smoke. Smoking can make the flu worse. If you need help quitting, talk to your doctor about stop-smoking programs and medicines. These can increase your chances of quitting for good.   · Breathe moist air from a hot shower or from a sink filled with hot water to help clear a stuffy nose. · Before you use cough and cold medicines, check the label. These medicines may not be safe for young children or for people with certain health problems. · If the skin around your nose and lips becomes sore, put some petroleum jelly on the area. · To ease coughing:  ¨ Drink fluids to soothe a scratchy throat. ¨ Suck on cough drops or plain hard candy. ¨ Take an over-the-counter cough medicine that contains dextromethorphan to help you get some sleep. Read and follow all instructions on the label. ¨ Raise your head at night with an extra pillow. This may help you rest if coughing keeps you awake. · Take any prescribed medicine exactly as directed. Call your doctor if you think you are having a problem with your medicine. To avoid spreading the flu  · Wash your hands regularly, and keep your hands away from your face. · Stay home from school, work, and other public places until you are feeling better and your fever has been gone for at least 24 hours. The fever needs to have gone away on its own without the help of medicine. · Ask people living with you to talk to their doctors about preventing the flu. They may get antiviral medicine to keep from getting the flu from you. · To prevent the flu in the future, get a flu vaccine every fall. Encourage people living with you to get the vaccine. · Cover your mouth when you cough or sneeze. When should you call for help? Call 911 anytime you think you may need emergency care. For example, call if:  · You have severe trouble breathing. Call your doctor now or seek immediate medical care if:  · You have new or worse trouble breathing. · You seem to be getting much sicker. · You feel very sleepy or confused. · You have a new or higher fever. · You get a new rash.   Watch closely for changes in your health, and be sure to contact your doctor if:  · You begin to get better and then get worse. · You are not getting better after 1 week. Where can you learn more? Go to http://blanche-devan.info/. Enter J727 in the search box to learn more about \"Influenza (Flu): Care Instructions. \"  Current as of: May 23, 2016  Content Version: 11.1  © 7508-7948 [employer]. Care instructions adapted under license by [employer] (which disclaims liability or warranty for this information). If you have questions about a medical condition or this instruction, always ask your healthcare professional. [address] any warranty or liability for your use of this information.

## 2017-03-24 NOTE — UC PROVIDER NOTE
Patient is a 78 y.o. male presenting with cough. The history is provided by the patient. No  was used. Cough   This is a new problem. Episode onset: 4 days. The problem occurs constantly. The problem has been gradually worsening. The cough is productive of sputum (White. ). There has been a fever of 101 - 101.9 F. The fever has been present for 1 - 2 days. Associated symptoms include rhinorrhea and myalgias. Pertinent negatives include no chest pain, no chills, no sweats, no eye redness, no ear congestion, no shortness of breath, no wheezing, no nausea and no vomiting. He has tried nothing for the symptoms. The treatment provided no relief. He is not a smoker. His past medical history does not include bronchitis, pneumonia, COPD, emphysema, asthma, cancer, heart failure or CHF. Past Medical History:   Diagnosis Date    Cancer ([facility] [address]     melanoma shoulder right,basal cell cancer nose    Diabetes (HCC)     GERD (gastroesophageal reflux disease)     HTN (hypertension)     Hyperlipemia     Ill-defined condition     insomnia    Sleep apnea     cpap        Past Surgical History:   Procedure Laterality Date    HAND/FINGER SURGERY UNLISTED      HX ORTHOPAEDIC      trigger finger left    HX OTHER SURGICAL      melonama removed 6 years ago    HX OTHER SURGICAL      basal skin cancer    LASER SURGERY OF EYE      UPPER GI ENDOSCOPY,BIOPSY  1/13/2017              Family History   Problem Relation Age of Onset    Cancer Mother     Diabetes Maternal Grandfather     Diabetes Sister     Kidney Disease Paternal Grandmother         Social History     Social History    Marital status:      Spouse name: N/A    Number of children: N/A    Years of education: N/A     Occupational History    Not on file.      Social History Main Topics    Smoking status: Never Smoker    Smokeless tobacco: Not on file    Alcohol use 1.8 oz/week     3 Glasses of wine per week      Comment: moderately  Drug use: No    Sexual activity: Not on file     Other Topics Concern    Not on file     Social History Narrative                ALLERGIES: Review of patient's allergies indicates no known allergies. Review of Systems   Constitutional: Negative. Negative for chills. HENT: Positive for rhinorrhea. Eyes: Negative. Negative for redness. Respiratory: Positive for cough. Negative for shortness of breath and wheezing. Cardiovascular: Negative. Negative for chest pain. Gastrointestinal: Negative. Negative for nausea and vomiting. Endocrine: Negative. Genitourinary: Negative. Musculoskeletal: Positive for myalgias. Skin: Negative. Allergic/Immunologic: Negative. Neurological: Negative. Hematological: Negative. Psychiatric/Behavioral: Negative. Vitals:    03/24/17 1052   BP: 126/75   Pulse: 89   Resp: 16   Temp: 97.3 °F (36.3 °C)   SpO2: 99%   Weight: 88.5 kg (195 lb)   Height: 5' 10\" (1.778 m)       Physical Exam   Constitutional: He is oriented to person, place, and time. He appears well-developed and well-nourished. HENT:   Head: Normocephalic and atraumatic. Right Ear: External ear normal.   Left Ear: External ear normal.   Nose: Nose normal.   Mouth/Throat: Oropharynx is clear and moist.   Eyes: Conjunctivae and EOM are normal. Pupils are equal, round, and reactive to light. Neck: Normal range of motion. Neck supple. Cardiovascular: Normal rate, regular rhythm and normal heart sounds. Pulmonary/Chest: Effort normal and breath sounds normal.   Musculoskeletal: Normal range of motion. Lymphadenopathy:     He has no cervical adenopathy. Neurological: He is alert and oriented to person, place, and time. Skin: Skin is warm and dry. Psychiatric: He has a normal mood and affect. His behavior is normal. Judgment and thought content normal.   Nursing note and vitals reviewed.       MDM     Differential Diagnosis; Clinical Impression; Plan:     CLINICAL IMPRESSION:  Influenza B  (primary encounter diagnosis)    Plan:  1. You have the flu. Stay home and rest, drink plenty of fluids. 2. Take Tamilfu as directed, Tessalon Perles for cough. 3. Follow up with PCP as needed. Amount and/or Complexity of Data Reviewed:   Clinical lab tests:  Ordered and reviewed  Risk of Significant Complications, Morbidity, and/or Mortality:   Presenting problems: Moderate  Diagnostic procedures:   Moderate  Progress:   Patient progress:  Stable      Procedures

## 2017-06-02 RX ORDER — INSULIN PUMP SYRINGE, 3 ML
EACH MISCELLANEOUS
Qty: 1 KIT | Refills: 0 | Status: SHIPPED | OUTPATIENT
Start: 2017-06-02 | End: 2017-10-25 | Stop reason: SDUPTHER

## 2017-06-02 RX ORDER — BLOOD SUGAR DIAGNOSTIC
STRIP MISCELLANEOUS
Qty: 100 STRIP | Refills: 10 | Status: SHIPPED | OUTPATIENT
Start: 2017-06-02 | End: 2017-06-12 | Stop reason: SDUPTHER

## 2017-06-05 ENCOUNTER — HOSPITAL ENCOUNTER (OUTPATIENT)
Dept: MRI IMAGING | Age: 77
Discharge: HOME | End: 2017-06-05
Attending: FAMILY MEDICINE
Payer: MEDICARE

## 2017-06-05 DIAGNOSIS — M25.512 PAIN IN LEFT SHOULDER: ICD-10-CM

## 2017-06-05 DIAGNOSIS — M50.30 OTHER CERVICAL DISC DEGENERATION, UNSPECIFIED CERVICAL REGION: ICD-10-CM

## 2017-06-05 DIAGNOSIS — M54.12 RADICULOPATHY, CERVICAL: ICD-10-CM

## 2017-06-05 DIAGNOSIS — M54.2 CERVICALGIA: ICD-10-CM

## 2017-06-05 PROCEDURE — 72141 MRI NECK SPINE W/O DYE: CPT

## 2017-06-12 RX ORDER — BLOOD SUGAR DIAGNOSTIC
STRIP MISCELLANEOUS
Qty: 100 STRIP | Refills: 10 | Status: SHIPPED | OUTPATIENT
Start: 2017-06-12 | End: 2017-10-25 | Stop reason: SDUPTHER

## 2017-06-16 LAB
ALBUMIN/CREAT UR: 31 MG/G CREAT (ref 0–30)
BUN SERPL-MCNC: 18 MG/DL (ref 8–27)
BUN/CREAT SERPL: 16 (ref 10–24)
CALCIUM SERPL-MCNC: 9.4 MG/DL (ref 8.6–10.2)
CHLORIDE SERPL-SCNC: 102 MMOL/L (ref 96–106)
CHOLEST SERPL-MCNC: 122 MG/DL (ref 100–199)
CO2 SERPL-SCNC: 24 MMOL/L (ref 18–29)
CREAT SERPL-MCNC: 1.11 MG/DL (ref 0.76–1.27)
CREAT UR-MCNC: 131.7 MG/DL
EST. AVERAGE GLUCOSE BLD GHB EST-MCNC: 148 MG/DL
GLUCOSE SERPL-MCNC: 115 MG/DL (ref 65–99)
HBA1C MFR BLD: 6.8 % (ref 4.8–5.6)
HDLC SERPL-MCNC: 36 MG/DL
INTERPRETATION, 910389: NORMAL
LDLC SERPL CALC-MCNC: 52 MG/DL (ref 0–99)
Lab: NORMAL
MICROALBUMIN UR-MCNC: 40.8 UG/ML
POTASSIUM SERPL-SCNC: 4.5 MMOL/L (ref 3.5–5.2)
SODIUM SERPL-SCNC: 143 MMOL/L (ref 134–144)
TRIGL SERPL-MCNC: 171 MG/DL (ref 0–149)
VLDLC SERPL CALC-MCNC: 34 MG/DL (ref 5–40)

## 2017-06-21 ENCOUNTER — OFFICE VISIT (OUTPATIENT)
Dept: ENDOCRINOLOGY | Age: 77
End: 2017-06-21

## 2017-06-21 VITALS
BODY MASS INDEX: 27.77 KG/M2 | WEIGHT: 194 LBS | SYSTOLIC BLOOD PRESSURE: 124 MMHG | DIASTOLIC BLOOD PRESSURE: 75 MMHG | HEIGHT: 70 IN | HEART RATE: 84 BPM

## 2017-06-21 DIAGNOSIS — Z79.4 TYPE 2 DIABETES MELLITUS WITH COMPLICATION, WITH LONG-TERM CURRENT USE OF INSULIN (HCC): Primary | ICD-10-CM

## 2017-06-21 DIAGNOSIS — I10 ESSENTIAL HYPERTENSION: ICD-10-CM

## 2017-06-21 DIAGNOSIS — E78.5 DYSLIPIDEMIA: ICD-10-CM

## 2017-06-21 DIAGNOSIS — E11.8 TYPE 2 DIABETES MELLITUS WITH COMPLICATION, WITH LONG-TERM CURRENT USE OF INSULIN (HCC): Primary | ICD-10-CM

## 2017-06-21 NOTE — PROGRESS NOTES
History of Present Illness: David Ortiz is a 78 y.o. male presents for follow-up of diabetes. He also has HTN and dyslipidemia and JOSEPH  A1c 6.8 with pre-labs, down from 7.9     Had 2 steroid injections in neck in May. Glucoses were higher around these times. Saw a neurosurgeon. He is not going to pursue surgery at this time. Overall, he feels the pain has improved steadily. Current diabetes regimen:  Metformin 1 g BID   Lantus: 36 units  Bydureon weekly - started 9/2013  Invokana 100 mg daily    Glucoses:  Mostly checks fasting -  of late. Highest values of 212 after steroid injection. He denies any low glucoses. Diet:   Eating smaller portions. Exercise - very limited. He feels he may be able to start walking on days that he does not progress. Feels he is sleeping better. Using CPAP     Wt - has maintained wt loss. Down 10-12 lbs vs 1 year ago. Lipids:  Rosuvastatin 10 mg daily-taking one half tablet daily. Blood pressure:  Losartan 50 mg - taking at bedtime. 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   Son and son's family are living with them. Past Medical History:   Diagnosis Date    Cancer [facility])     melanoma shoulder right,basal cell cancer nose    Diabetes (HCC)     GERD (gastroesophageal reflux disease)     HTN (hypertension)     Hyperlipemia     Ill-defined condition     insomnia    Sleep apnea     cpap     Current Outpatient Prescriptions   Medication Sig    ONETOUCH ULTRA TEST strip USE AS DIRECTED TWICE A DAY Dx: E11.9    Blood-Glucose Meter (ONETOUCH ULTRA2) monitoring kit Monitor blood sugar twice daily. Diagnosis code: E11.9    famotidine (PEPCID) 20 mg tablet TAKE 1 TAB BY MOUTH TWO (2) TIMES A DAY.  CELECOXIB (CELEBREX PO) Take  by mouth.  acetaminophen (TYLENOL) 325 mg tablet Take  by mouth every four (4) hours as needed for Pain.     metFORMIN (GLUCOPHAGE) 1,000 mg tablet TAKE 1 TABLET BY MOUTH TWICE A DAY WITH FOOD    BYDUREON 2 mg/0.65 mL pnij INJECT 2MG EVERY 7 DAYS    LANTUS 100 unit/mL injection INJECT 36 UNITS SUBCUTANEOUSLY EVERY NIGHT (Patient taking differently: 36 units daily in am)    INVOKANA 100 mg tablet TAKE 1 TAB BY MOUTH DAILY (BEFORE BREAKFAST).  losartan (COZAAR) 50 mg tablet Take 1 Tab by mouth two (2) times a day. (Patient taking differently: Take 50 mg by mouth daily.)    rosuvastatin (CRESTOR) 10 mg tablet Take 1 Tab by mouth nightly.  cyanocobalamin (VITAMIN B-12) 1,000 mcg tablet Take 1,000 mcg by mouth daily.  MELATONIN, BULK, Take 3 mg by mouth nightly as needed.  BD INSULIN SYRINGE ULTRA-FINE 1 mL 31 x 5/16\" syrg USING ONCE DAILY WITH LANTUS    Insulin Needles, Disposable, (TITI PEN NEEDLE) 32 x 5/32 \" Ndle Twice daily    aspirin 81 mg tablet Take 81 mg by mouth.  benzonatate (TESSALON) 200 mg capsule Take 1 Cap by mouth three (3) times daily as needed for Cough. No current facility-administered medications for this visit. No Known Allergies    Review of Systems:  - Eyes: no blurry vision or double vision  - Cardiovascular: no chest pain  - Respiratory: no shortness of breath  - Musculoskeletal: + left sided neck pain. 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 Saw surgeon.   - Neurological: no numbness/tingling in extremities    Physical Examination:  Visit Vitals    /75    Pulse 84    Ht 5' 10\" (1.778 m)    Wt 194 lb (88 kg)    BMI 27.84 kg/m2   -   - General: pleasant, no distress, normal gait   HEENT: hearing intact, EOMI, clear sclera without icterus  - Cardiovascular: regular, normal rate   - Respiratory: normal effort  - Integumentary: no edema   Diabetic foot exam: Yes me to look at his great toe on his right foot. There is a area of concern at the tip. This appears to just be a small bruise. No surrounding erythema.       Left: Filament test normal sensation with micro filament   Pulse DP: 2+ (normal)   Deformities: None  Right: Filament test normal sensation with micro filament   Pulse DP: 2+ (normal)   Deformities: None   Has mycotic appearing toenails on both feet. - Psychiatric: normal mood and affect    Data Reviewed:   Component      Latest Ref Rng & Units 6/15/2017 6/15/2017 6/15/2017 6/15/2017           7:42 AM  7:42 AM  7:42 AM  7:42 AM   Glucose      65 - 99 mg/dL 115 (H)      BUN      8 - 27 mg/dL 18      Creatinine      0.76 - 1.27 mg/dL 1.11      GFR est non-AA      >59 mL/min/1.73 65      GFR est AA      >59 mL/min/1.73 75      BUN/Creatinine ratio      10 - 24 16      Sodium      134 - 144 mmol/L 143      Potassium      3.5 - 5.2 mmol/L 4.5      Chloride      96 - 106 mmol/L 102      CO2      18 - 29 mmol/L 24      Calcium      8.6 - 10.2 mg/dL 9.4      Cholesterol, total      100 - 199 mg/dL  122     Triglyceride      0 - 149 mg/dL  171 (H)     HDL Cholesterol      >39 mg/dL  36 (L)     VLDL, calculated      5 - 40 mg/dL  34     LDL, calculated      0 - 99 mg/dL  52     Creatinine, urine      Not Estab. mg/dL    131.7   Microalbumin, urine      Not Estab. ug/mL    40.8   Microalbumin/Creat. Ratio      0.0 - 30.0 mg/g creat    31.0 (H)   Hemoglobin A1c, (calculated)      4.8 - 5.6 %   6.8 (H)    Estimated average glucose      mg/dL   148        Assessment/Plan:   1. Type 2 diabetes mellitus with complication, with long-term current use of insulin ([facility] [address]   -Diabetes is well controlled. Glucoses are largely at goal.  Continue Lantus 36 units daily. Advised him to decrease in 3-4 unit increments weekly, if glucoses are less than 90. Continue metformin, Invokana, Bydureon  Encouraged continued dietary efforts and weight loss efforts. Recommend he start exercising regularly    2. Essential hypertension   -continue losartan for blood pressure and for microalbumin urea -    3. Dyslipidemia -continue rosuvastatin    4. BMI 27.0-27.9,adult   He is making progress in losing weight.   Encouraged continued efforts with his diet. Also recommended he start walking on a regular basis. We will decrease insulin as indicated to further assist with his weight loss efforts. Patient Instructions   Diabetes:  Continue efforts with weight loss  Continue Bydureon  Continue Invokana 100 mg  Continue metformin - Take 1000 mg twice daily  Continue Lantus to 35 units - follow trends from bedtime to fasting to assess. ** Decrease by 3-4 units if you have values < 90 at bedtime    Goals for blood sugars:  ** focus on checking bedtime and fasting sugars  Fasting  (less than 150)  Other times -  (less than 180). Cholesterol   continue rosuvastatin - 10 mg - continue taking 1/2 tablet    Blood pressure:  Losartan 50 mg - at bedtime        Follow-up Disposition:  Return in about 4 months (around 10/21/2017).

## 2017-06-21 NOTE — PATIENT INSTRUCTIONS
Diabetes:  Continue efforts with weight loss  Continue Bydureon  Continue Invokana 100 mg  Continue metformin - Take 1000 mg twice daily  Continue Lantus to 35 units - follow trends from bedtime to fasting to assess. ** Decrease by 3-4 units if you have values < 90 at bedtime    Goals for blood sugars:  ** focus on checking bedtime and fasting sugars  Fasting  (less than 150)  Other times -  (less than 180).     Cholesterol   continue rosuvastatin - 10 mg - continue taking 1/2 tablet    Blood pressure:  Losartan 50 mg - at bedtime

## 2017-06-21 NOTE — MR AVS SNAPSHOT
Visit Information Date & Time Provider Department Dept. Phone Encounter #  
 6/21/2017 10:30 AM Sara Cameron 346 Diabetes and Endocrinology  Follow-up Instructions Return in about 4 months (around 10/21/2017). Upcoming Health Maintenance Date Due DTaP/Tdap/Td series (1 - Tdap) 9/5/1963 FOBT Q 1 YEAR AGE 50-75 9/5/1992 ZOSTER VACCINE AGE 60> 9/5/2002 GLAUCOMA SCREENING Q2Y 9/5/2007 Pneumococcal 65+ Low/Medium Risk (1 of 2 - PCV13) 9/5/2007 MEDICARE YEARLY EXAM 9/5/2007 EYE EXAM RETINAL OR DILATED Q1 11/12/2015 FOOT EXAM Q1 3/24/2017 INFLUENZA AGE 9 TO ADULT 8/1/2017 HEMOGLOBIN A1C Q6M 12/15/2017 MICROALBUMIN Q1 6/15/2018 LIPID PANEL Q1 6/15/2018 Allergies as of 6/21/2017  Review Complete On: 6/21/2017 By: Rosetta Millan MD  
 No Known Allergies Current Immunizations  Never Reviewed No immunizations on file. Not reviewed this visit You Were Diagnosed With   
  
 Codes Comments Type 2 diabetes mellitus with complication, with long-term current use of insulin (HCC)    -  Primary ICD-10-CM: E11.8, Z79.4 ICD-9-CM: 250.90, V58.67 Essential hypertension     ICD-10-CM: I10 
ICD-9-CM: 401.9 Dyslipidemia     ICD-10-CM: E78.5 ICD-9-CM: 272.4 BMI 27.0-27.9,adult     ICD-10-CM: Y24.26 ICD-9-CM: V85.23 Vitals BP Pulse Height(growth percentile) Weight(growth percentile) BMI Smoking Status 124/75 84 5' 10\" (1.778 m) 194 lb (88 kg) 27.84 kg/m2 Never Smoker Vitals History BMI and BSA Data Body Mass Index Body Surface Area  
 27.84 kg/m 2 2.08 m 2 Preferred Pharmacy Pharmacy Name Phone [id number] [address], [address] [phone] Your Updated Medication List  
  
   
This list is accurate as of: 6/21/17 10:58 AM.  Always use your most recent med list.  
  
  
  
  
 aspirin 81 mg tablet Take 81 mg by mouth. BD INSULIN SYRINGE ULTRA-FINE 1 mL 31 gauge x 5/16 Syrg Generic drug:  Insulin Syringe-Needle U-100 USING ONCE DAILY WITH LANTUS Blood-Glucose Meter monitoring kit Commonly known as:  [place] Monitor blood sugar twice daily. Diagnosis code: E11.9 BYDUREON 2 mg/0.65 mL Pnij Generic drug:  exenatide microspheres INJECT 2MG EVERY 7 DAYS  
  
 CELEBREX PO Take  by mouth. famotidine 20 mg tablet Commonly known as:  PEPCID  
TAKE 1 TAB BY MOUTH TWO (2) TIMES A DAY. Insulin Needles (Disposable) 32 gauge x 5/32\" Ndle Commonly known as:  Silvia Pen Needle Twice daily INVOKANA 100 mg tablet Generic drug:  canagliflozin TAKE 1 TAB BY MOUTH DAILY (BEFORE BREAKFAST). LANTUS 100 unit/mL injection Generic drug:  insulin glargine INJECT 36 UNITS SUBCUTANEOUSLY EVERY NIGHT  
  
 losartan 50 mg tablet Commonly known as:  COZAAR Take 1 Tab by mouth two (2) times a day. MELATONIN (BULK) Take 3 mg by mouth nightly as needed. metFORMIN 1,000 mg tablet Commonly known as:  GLUCOPHAGE  
TAKE 1 TABLET BY MOUTH TWICE A DAY WITH FOOD  
  
 ONETOUCH ULTRA TEST strip Generic drug:  glucose blood VI test strips USE AS DIRECTED TWICE A DAY Dx: E11.9  
  
 rosuvastatin 10 mg tablet Commonly known as:  CRESTOR Take 1 Tab by mouth nightly. TYLENOL 325 mg tablet Generic drug:  acetaminophen Take  by mouth every four (4) hours as needed for Pain. VITAMIN B-12 1,000 mcg tablet Generic drug:  cyanocobalamin Take 1,000 mcg by mouth daily. Follow-up Instructions Return in about 4 months (around 10/21/2017). Patient Instructions Diabetes: 
Continue efforts with weight loss Continue Bydureon Continue Invokana 100 mg 
Continue metformin - Take 1000 mg twice daily Continue Lantus to 35 units - follow trends from bedtime to fasting to assess. ** Decrease by 3-4 units if you have values < 90 at bedtime Goals for blood sugars: 
** focus on checking bedtime and fasting sugars Fasting  (less than 150) Other times -  (less than 180). Cholesterol  
continue rosuvastatin - 10 mg - continue taking 1/2 tablet Blood pressure: 
Losartan 50 mg - at bedtime Introducing [facility]! Dear Deniz Daniels: 
Thank you for requesting a [employer] account. Our records indicate that you already have an active [employer] account. You can access your account anytime at [email or web address]. [employer] Did you know that you can access your hospital and ER discharge instructions at any time in [employer]? You can also review all of your test results from your hospital stay or ER visit. Additional Information If you have questions, please visit the Frequently Asked Questions section of the [employer] website at [email or web address] Remember, [employer] is NOT to be used for urgent needs. For medical emergencies, dial 911. Now available from your iPhone and Android! Please provide this summary of care documentation to your next provider. Your primary care clinician is listed as Aubrey Guadarrama. If you have any questions after today's visit, please call [phone].

## 2017-07-28 RX ORDER — INSULIN GLARGINE 100 [IU]/ML
INJECTION, SOLUTION SUBCUTANEOUS
Qty: 20 ML | Refills: 11 | Status: SHIPPED | OUTPATIENT
Start: 2017-07-28 | End: 2018-08-01 | Stop reason: SDUPTHER

## 2017-07-28 RX ORDER — INSULIN GLARGINE 100 [IU]/ML
INJECTION, SOLUTION SUBCUTANEOUS
Qty: 20 ML | Refills: 10 | Status: SHIPPED | OUTPATIENT
Start: 2017-07-28 | End: 2018-07-11

## 2017-08-14 RX ORDER — ROSUVASTATIN CALCIUM 10 MG/1
TABLET, COATED ORAL
Qty: 30 TAB | Refills: 6 | Status: SHIPPED | OUTPATIENT
Start: 2017-08-14 | End: 2018-04-23 | Stop reason: SDUPTHER

## 2017-08-20 RX ORDER — CANAGLIFLOZIN 100 MG/1
TABLET, FILM COATED ORAL
Qty: 30 TAB | Refills: 10 | Status: SHIPPED | OUTPATIENT
Start: 2017-08-20 | End: 2018-04-23 | Stop reason: SDUPTHER

## 2017-09-07 RX ORDER — LANCETS 33 GAUGE
EACH MISCELLANEOUS
Qty: 100 LANCET | Refills: 10 | Status: SHIPPED | OUTPATIENT
Start: 2017-09-07 | End: 2017-10-25 | Stop reason: SDUPTHER

## 2017-10-04 LAB
ALBUMIN SERPL-MCNC: 4.4 G/DL (ref 3.5–4.8)
ALBUMIN/GLOB SERPL: 1.8 {RATIO} (ref 1.2–2.2)
ALP SERPL-CCNC: 56 IU/L (ref 39–117)
ALT SERPL-CCNC: 19 IU/L (ref 0–44)
AST SERPL-CCNC: 21 IU/L (ref 0–40)
BILIRUB SERPL-MCNC: 0.5 MG/DL (ref 0–1.2)
BUN SERPL-MCNC: 21 MG/DL (ref 8–27)
BUN/CREAT SERPL: 18 (ref 10–24)
CALCIUM SERPL-MCNC: 9.6 MG/DL (ref 8.6–10.2)
CHLORIDE SERPL-SCNC: 102 MMOL/L (ref 96–106)
CHOLEST SERPL-MCNC: 126 MG/DL (ref 100–199)
CO2 SERPL-SCNC: 22 MMOL/L (ref 18–29)
CREAT SERPL-MCNC: 1.19 MG/DL (ref 0.76–1.27)
EST. AVERAGE GLUCOSE BLD GHB EST-MCNC: 143 MG/DL
GLOBULIN SER CALC-MCNC: 2.5 G/DL (ref 1.5–4.5)
GLUCOSE SERPL-MCNC: 141 MG/DL (ref 65–99)
HBA1C MFR BLD: 6.6 % (ref 4.8–5.6)
HDLC SERPL-MCNC: 34 MG/DL
INTERPRETATION, 910389: NORMAL
INTERPRETATION: NORMAL
LDLC SERPL CALC-MCNC: 49 MG/DL (ref 0–99)
Lab: NORMAL
PDF IMAGE, 910387: NORMAL
POTASSIUM SERPL-SCNC: 4.5 MMOL/L (ref 3.5–5.2)
PROT SERPL-MCNC: 6.9 G/DL (ref 6–8.5)
SODIUM SERPL-SCNC: 142 MMOL/L (ref 134–144)
TRIGL SERPL-MCNC: 217 MG/DL (ref 0–149)
VLDLC SERPL CALC-MCNC: 43 MG/DL (ref 5–40)

## 2017-10-11 ENCOUNTER — OFFICE VISIT (OUTPATIENT)
Dept: ENDOCRINOLOGY | Age: 77
End: 2017-10-11

## 2017-10-11 VITALS
DIASTOLIC BLOOD PRESSURE: 76 MMHG | SYSTOLIC BLOOD PRESSURE: 125 MMHG | BODY MASS INDEX: 27.92 KG/M2 | WEIGHT: 195 LBS | HEIGHT: 70 IN | HEART RATE: 77 BPM

## 2017-10-11 DIAGNOSIS — Z79.4 TYPE 2 DIABETES MELLITUS WITH COMPLICATION, WITH LONG-TERM CURRENT USE OF INSULIN (HCC): Primary | ICD-10-CM

## 2017-10-11 DIAGNOSIS — I10 ESSENTIAL HYPERTENSION: ICD-10-CM

## 2017-10-11 DIAGNOSIS — E78.5 DYSLIPIDEMIA: ICD-10-CM

## 2017-10-11 DIAGNOSIS — E11.8 TYPE 2 DIABETES MELLITUS WITH COMPLICATION, WITH LONG-TERM CURRENT USE OF INSULIN (HCC): Primary | ICD-10-CM

## 2017-10-11 NOTE — MR AVS SNAPSHOT
Visit Information Date & Time Provider Department Dept. Phone Encounter #  
 10/11/2017 10:10 AM Sara Rivera 346 Diabetes and Endocrinology  Follow-up Instructions Return in about 4 months (around 2/11/2018). Upcoming Health Maintenance Date Due DTaP/Tdap/Td series (1 - Tdap) 9/5/1963 ZOSTER VACCINE AGE 60> 7/5/2002 GLAUCOMA SCREENING Q2Y 9/5/2007 Pneumococcal 65+ Low/Medium Risk (1 of 2 - PCV13) 9/5/2007 MEDICARE YEARLY EXAM 9/5/2007 EYE EXAM RETINAL OR DILATED Q1 11/12/2015 FOOT EXAM Q1 3/24/2017 INFLUENZA AGE 9 TO ADULT 8/1/2017 HEMOGLOBIN A1C Q6M 4/3/2018 MICROALBUMIN Q1 6/15/2018 LIPID PANEL Q1 10/3/2018 Allergies as of 10/11/2017  Review Complete On: 10/11/2017 By: Radha Fermin MD  
 No Known Allergies Current Immunizations  Never Reviewed No immunizations on file. Not reviewed this visit You Were Diagnosed With   
  
 Codes Comments Type 2 diabetes mellitus with complication, with long-term current use of insulin (HCC)    -  Primary ICD-10-CM: E11.8, Z79.4 ICD-9-CM: 250.90, V58.67 Essential hypertension     ICD-10-CM: I10 
ICD-9-CM: 401.9 Dyslipidemia     ICD-10-CM: E78.5 ICD-9-CM: 272.4 BMI 27.0-27.9,adult     ICD-10-CM: V69.17 ICD-9-CM: V85.23 Vitals BP Pulse Height(growth percentile) Weight(growth percentile) BMI Smoking Status 125/76 77 5' 10\" (1.778 m) 195 lb (88.5 kg) 27.98 kg/m2 Never Smoker Vitals History BMI and BSA Data Body Mass Index Body Surface Area  
 27.98 kg/m 2 2.09 m 2 Preferred Pharmacy Pharmacy Name Phone [id number] [address] [phone] Your Updated Medication List  
  
   
This list is accurate as of: 10/11/17 11:08 AM.  Always use your most recent med list.  
  
  
  
  
 aspirin 81 mg tablet Take 81 mg by mouth. BD INSULIN SYRINGE ULTRA-FINE 1 mL 31 gauge x 5/16 Syrg Generic drug:  Insulin Syringe-Needle U-100 USING ONCE DAILY WITH LANTUS Blood-Glucose Meter monitoring kit Commonly known as:  Andrew Sharp Monitor blood sugar twice daily. Diagnosis code: E11.9 BYDUREON 2 mg/0.65 mL Pnij Generic drug:  exenatide microspheres INJECT 2MG EVERY 7 DAYS  
  
 CELEBREX PO Take 200 mg by mouth. Every other day  
  
 famotidine 20 mg tablet Commonly known as:  PEPCID  
TAKE 1 TAB BY MOUTH TWO (2) TIMES A DAY. Insulin Needles (Disposable) 32 gauge x 5/32\" Ndle Commonly known as:  Silvia Pen Needle Twice daily INVOKANA 100 mg tablet Generic drug:  canagliflozin TAKE 1 TAB BY MOUTH DAILY (BEFORE BREAKFAST). lancets 33 gauge Misc Commonly known as: [employer] Monitor blood sugar twice daily. Diagnosis code: E11.9 * LANTUS 100 unit/mL injection Generic drug:  insulin glargine INJECT 36 UNITS SUBCUTANEOUSLY EVERY NIGHT * insulin glargine 100 unit/mL injection Commonly known as:  LANTUS INJECT 36 UNITS SUBCUTANEOUSLY EVERY NIGHT  
  
 losartan 50 mg tablet Commonly known as:  COZAAR Take 1 Tab by mouth two (2) times a day. MELATONIN (BULK) Take 3 mg by mouth nightly as needed. metFORMIN 1,000 mg tablet Commonly known as:  GLUCOPHAGE  
TAKE 1 TABLET BY MOUTH TWICE A DAY WITH FOOD  
  
 ONETOUCH ULTRA TEST strip Generic drug:  glucose blood VI test strips USE AS DIRECTED TWICE A DAY Dx: E11.9  
  
 rosuvastatin 10 mg tablet Commonly known as:  CRESTOR  
TAKE 1 TAB BY MOUTH NIGHTLY. TYLENOL 325 mg tablet Generic drug:  acetaminophen Take  by mouth every four (4) hours as needed for Pain. VITAMIN B-12 1,000 mcg tablet Generic drug:  cyanocobalamin Take 1,000 mcg by mouth daily. * Notice:   This list has 2 medication(s) that are the same as other medications prescribed for you. Read the directions carefully, and ask your doctor or other care provider to review them with you. We Performed the Following HEMOGLOBIN A1C WITH EAG [67838 CPT(R)] LIPID PANEL [85693 CPT(R)] METABOLIC PANEL, COMPREHENSIVE [68387 CPT(R)] MICROALBUMIN, UR, RAND W/ MICROALBUMIN/CREA RATIO [id number] CPT(R)] TSH 3RD GENERATION [57526 CPT(R)] Follow-up Instructions Return in about 4 months (around 2/11/2018). Patient Instructions Diabetes: 
Continue efforts with weight loss. Increase exercise Continue Bydureon Continue Invokana 100 mg 
Continue metformin - Take 1000 mg twice daily Continue Lantus - - Decrease to 32 units daily and reassess in 1 week. ** Lower Lantus dose if you have values less than 100 often. Goals for blood sugars: 
** focus on checking bedtime and fasting sugars Fasting  (less than 150) Other times -  (less than 180). Cholesterol  
continue rosuvastatin - 10 mg - continue taking 1/2 tablet Blood pressure: 
Losartan 50 mg - at bedtime Introducing [facility]! Dear Sabina Garcia: 
Thank you for requesting a [employer] account. Our records indicate that you already have an active [employer] account. You can access your account anytime at [email or web address]. [employer] Did you know that you can access your hospital and ER discharge instructions at any time in [employer]? You can also review all of your test results from your hospital stay or ER visit. Additional Information If you have questions, please visit the Frequently Asked Questions section of the [employer] website at [email or web address]/. Remember, [employer] is NOT to be used for urgent needs. For medical emergencies, dial 911. Now available from your iPhone and Android! Please provide this summary of care documentation to your next provider. Your primary care clinician is listed as Kwabena Ramos. If you have any questions after today's visit, please call [phone].

## 2017-10-11 NOTE — PATIENT INSTRUCTIONS
Diabetes:  Continue efforts with weight loss. Increase exercise    Continue Bydureon  Continue Invokana 100 mg  Continue metformin - Take 1000 mg twice daily  Continue Lantus - - Decrease to 32 units daily and reassess in 1 week. ** Lower Lantus dose if you have values less than 100 often. Goals for blood sugars:  ** focus on checking bedtime and fasting sugars  Fasting  (less than 150)  Other times -  (less than 180).     Cholesterol   continue rosuvastatin - 10 mg - continue taking 1/2 tablet    Blood pressure:  Losartan 50 mg - at bedtime

## 2017-10-23 RX ORDER — LOSARTAN POTASSIUM 50 MG/1
50 TABLET ORAL
Qty: 90 TAB | Refills: 3 | Status: SHIPPED | OUTPATIENT
Start: 2017-10-23 | End: 2018-10-16 | Stop reason: SDUPTHER

## 2017-10-25 RX ORDER — BLOOD SUGAR DIAGNOSTIC
STRIP MISCELLANEOUS
Qty: 100 STRIP | Refills: 10 | Status: SHIPPED | OUTPATIENT
Start: 2017-10-25 | End: 2018-06-10 | Stop reason: SDUPTHER

## 2017-10-25 RX ORDER — LANCETS 33 GAUGE
EACH MISCELLANEOUS
Qty: 100 LANCET | Refills: 10 | Status: SHIPPED | OUTPATIENT
Start: 2017-10-25

## 2017-10-25 RX ORDER — INSULIN PUMP SYRINGE, 3 ML
EACH MISCELLANEOUS
Qty: 1 KIT | Refills: 0 | Status: SHIPPED | OUTPATIENT
Start: 2017-10-25

## 2017-10-25 NOTE — TELEPHONE ENCOUNTER
Patient called to ask that you phone in a prescription to his pharmacy for more Lancets and Lancing Devices. He can be reached at:   [phone].     [facility]   [phone]  Lancets & Lancing Device

## 2017-10-30 RX ORDER — LANCING DEVICE
EACH MISCELLANEOUS
Qty: 1 EACH | Refills: 0 | Status: SHIPPED | OUTPATIENT
Start: 2017-10-30

## 2017-11-29 NOTE — TELEPHONE ENCOUNTER
Patient is requesting a refill on Bydureon to be sent to his pharmacy. He stated that he is going out of town tomorrow and is completely out. Patient can be reached at [phone].

## 2018-01-03 RX ORDER — METFORMIN HYDROCHLORIDE 1000 MG/1
TABLET ORAL
Qty: 180 TAB | Refills: 3 | Status: SHIPPED | OUTPATIENT
Start: 2018-01-03 | End: 2018-12-31 | Stop reason: SDUPTHER

## 2018-02-14 LAB
ALBUMIN SERPL-MCNC: 4.6 G/DL (ref 3.5–4.8)
ALBUMIN/CREAT UR: 27.1 MG/G CREAT (ref 0–30)
ALBUMIN/GLOB SERPL: 1.8 {RATIO} (ref 1.2–2.2)
ALP SERPL-CCNC: 56 IU/L (ref 39–117)
ALT SERPL-CCNC: 24 IU/L (ref 0–44)
AST SERPL-CCNC: 23 IU/L (ref 0–40)
BILIRUB SERPL-MCNC: 0.5 MG/DL (ref 0–1.2)
BUN SERPL-MCNC: 16 MG/DL (ref 8–27)
BUN/CREAT SERPL: 13 (ref 10–24)
CALCIUM SERPL-MCNC: 10 MG/DL (ref 8.6–10.2)
CHLORIDE SERPL-SCNC: 100 MMOL/L (ref 96–106)
CHOLEST SERPL-MCNC: 120 MG/DL (ref 100–199)
CO2 SERPL-SCNC: 24 MMOL/L (ref 18–29)
CREAT SERPL-MCNC: 1.2 MG/DL (ref 0.76–1.27)
CREAT UR-MCNC: 88.3 MG/DL
EST. AVERAGE GLUCOSE BLD GHB EST-MCNC: 151 MG/DL
GFR SERPLBLD CREATININE-BSD FMLA CKD-EPI: 59 ML/MIN/1.73
GFR SERPLBLD CREATININE-BSD FMLA CKD-EPI: 68 ML/MIN/1.73
GLOBULIN SER CALC-MCNC: 2.6 G/DL (ref 1.5–4.5)
GLUCOSE SERPL-MCNC: 128 MG/DL (ref 65–99)
HBA1C MFR BLD: 6.9 % (ref 4.8–5.6)
HDLC SERPL-MCNC: 35 MG/DL
INTERPRETATION, 910389: NORMAL
INTERPRETATION: NORMAL
LDLC SERPL CALC-MCNC: 46 MG/DL (ref 0–99)
Lab: NORMAL
MICROALBUMIN UR-MCNC: 23.9 UG/ML
PDF IMAGE, 910387: NORMAL
POTASSIUM SERPL-SCNC: 4.2 MMOL/L (ref 3.5–5.2)
PROT SERPL-MCNC: 7.2 G/DL (ref 6–8.5)
SODIUM SERPL-SCNC: 141 MMOL/L (ref 134–144)
TRIGL SERPL-MCNC: 197 MG/DL (ref 0–149)
TSH SERPL DL<=0.005 MIU/L-ACNC: 2.51 UIU/ML (ref 0.45–4.5)
VLDLC SERPL CALC-MCNC: 39 MG/DL (ref 5–40)

## 2018-03-27 ENCOUNTER — OFFICE VISIT (OUTPATIENT)
Dept: ENDOCRINOLOGY | Age: 78
End: 2018-03-27

## 2018-03-27 VITALS
HEART RATE: 71 BPM | DIASTOLIC BLOOD PRESSURE: 77 MMHG | SYSTOLIC BLOOD PRESSURE: 141 MMHG | WEIGHT: 197 LBS | BODY MASS INDEX: 28.2 KG/M2 | HEIGHT: 70 IN

## 2018-03-27 DIAGNOSIS — Z79.4 TYPE 2 DIABETES MELLITUS WITH COMPLICATION, WITH LONG-TERM CURRENT USE OF INSULIN (HCC): Primary | ICD-10-CM

## 2018-03-27 DIAGNOSIS — I10 ESSENTIAL HYPERTENSION: ICD-10-CM

## 2018-03-27 DIAGNOSIS — E78.5 DYSLIPIDEMIA: ICD-10-CM

## 2018-03-27 DIAGNOSIS — E11.8 TYPE 2 DIABETES MELLITUS WITH COMPLICATION, WITH LONG-TERM CURRENT USE OF INSULIN (HCC): Primary | ICD-10-CM

## 2018-03-27 DIAGNOSIS — K21.9 GASTROESOPHAGEAL REFLUX DISEASE, ESOPHAGITIS PRESENCE NOT SPECIFIED: ICD-10-CM

## 2018-03-27 RX ORDER — FAMOTIDINE 40 MG/1
40 TABLET, FILM COATED ORAL 2 TIMES DAILY
Qty: 180 TAB | Refills: 3 | Status: SHIPPED | OUTPATIENT
Start: 2018-03-27 | End: 2019-04-01 | Stop reason: SDUPTHER

## 2018-03-27 NOTE — MR AVS SNAPSHOT
[address] 
[phone] Patient: Rae Galvin MRN: [id number] RAC:[birth year] Visit Information Date & Time Provider Department Dept. Phone Encounter #  
 3/27/2018  1:30 PM Sheri Gray, [address] [facility] Diabetes and Endocrinology [phone] Follow-up Instructions Return in about 4 months (around 7/27/2018). Upcoming Health Maintenance Date Due DTaP/Tdap/Td series (1 - Tdap) 9/5/1963 ZOSTER VACCINE AGE 60> 7/5/2002 GLAUCOMA SCREENING Q2Y 9/5/2007 Pneumococcal 65+ Low/Medium Risk (1 of 2 - PCV13) 9/5/2007 EYE EXAM RETINAL OR DILATED Q1 11/12/2015 Influenza Age 5 to Adult 8/1/2017 MEDICARE YEARLY EXAM 3/14/2018 HEMOGLOBIN A1C Q6M 8/13/2018 FOOT EXAM Q1 10/11/2018 MICROALBUMIN Q1 2/13/2019 LIPID PANEL Q1 2/13/2019 Allergies as of 3/27/2018  Review Complete On: 3/27/2018 By: Sheri Gray MD  
 No Known Allergies Current Immunizations  Never Reviewed No immunizations on file. Not reviewed this visit You Were Diagnosed With   
  
 Codes Comments Type 2 diabetes mellitus with complication, with long-term current use of insulin (HCC)    -  Primary ICD-10-CM: E11.8, Z79.4 ICD-9-CM: 250.90, V58.67 Essential hypertension     ICD-10-CM: I10 
ICD-9-CM: 401.9 Dyslipidemia     ICD-10-CM: E78.5 ICD-9-CM: 272.4 BMI 28.0-28.9,adult     ICD-10-CM: K45.04 
ICD-9-CM: V85.24 Vitals BP Pulse Height(growth percentile) Weight(growth percentile) BMI Smoking Status 141/77 71 5' 10\" (1.778 m) 197 lb (89.4 kg) 28.27 kg/m2 Never Smoker Vitals History BMI and BSA Data Body Mass Index Body Surface Area  
 28.27 kg/m 2 2.1 m 2 [facility] Pharm[id number] [phone] Your Updated Medication List  
  
   
This list is accurate as of 3/27/18  2:20 PM.  Always use your most recent med list.  
  
  
  
  
 aspirin 81 mg tablet Take 81 mg by mouth. BD INSULIN SYRINGE ULTRA-FINE 1 mL 31 gauge x 5/16 Syrg Generic drug:  Insulin Syringe-Needle U-100 USING ONCE DAILY WITH LANTUS Blood-Glucose Meter monitoring kit Commonly known as:  Nickie Hutchinson Monitor blood sugar twice daily. Diagnosis code: E11.9 CELEBREX PO Take 200 mg by mouth. Every other day  
  
 exenatide microspheres 2 mg/0.65 mL Pnij Commonly known as:  BYDUREON  
2 mg by SubCUTAneous route every seven (7) days. famotidine 20 mg tablet Commonly known as:  PEPCID  
TAKE 1 TAB BY MOUTH TWO (2) TIMES A DAY. Insulin Needles (Disposable) 32 gauge x 5/32\" Ndle Commonly known as:  Silvia Pen Needle Twice daily INVOKANA 100 mg tablet Generic drug:  canagliflozin TAKE 1 TAB BY MOUTH DAILY (BEFORE BREAKFAST). lancets 33 gauge Misc Commonly known as: One Touch [employer] Monitor blood sugar twice daily. Diagnosis code: E11.9 Lancing Device Misc Monitor blood sugar twice daily. Diagnosis code: E11.8 * LANTUS U-100 INSULIN 100 unit/mL injection Generic drug:  insulin glargine INJECT 36 UNITS SUBCUTANEOUSLY EVERY NIGHT * insulin glargine 100 unit/mL injection Commonly known as:  LANTUS U-100 INSULIN INJECT 36 UNITS SUBCUTANEOUSLY EVERY NIGHT  
  
 losartan 50 mg tablet Commonly known as:  COZAAR Take 1 Tab by mouth nightly. MELATONIN (BULK) Take 3 mg by mouth nightly as needed. metFORMIN 1,000 mg tablet Commonly known as:  GLUCOPHAGE  
TAKE 1 TABLET BY MOUTH TWICE A DAY WITH FOOD  
  
 ONETOUCH ULTRA TEST strip Generic drug:  glucose blood VI test strips USE AS DIRECTED TWICE A DAY Dx: E11.9  
  
 rosuvastatin 10 mg tablet Commonly known as:  CRESTOR  
TAKE 1 TAB BY MOUTH NIGHTLY. TYLENOL 325 mg tablet Generic drug:  acetaminophen Take  by mouth every four (4) hours as needed for Pain. VITAMIN B-12 1,000 mcg tablet Generic drug:  cyanocobalamin Take 1,000 mcg by mouth daily. * Notice: This list has 2 medication(s) that are the same as other medications prescribed for you. Read the directions carefully, and ask your doctor or other care provider to review them with you. We Performed the Following HEMOGLOBIN A1C WITH EAG [55151 CPT(R)] LIPID PANEL [37032 CPT(R)] METABOLIC PANEL, COMPREHENSIVE [72634 CPT(R)] MICROALBUMIN, UR, RAND W/ MICROALB/CREAT RATIO [id number] CPT(R)] Follow-up Instructions Return in about 4 months (around 7/27/2018). Patient Instructions Diabetes: 
Continue efforts with weight loss. Add walking Continue Bydureon Continue Invokana 100 mg 
Continue metformin - Take 1000 mg twice daily Continue Lantus - -continue 36 units daily. Goals for blood sugars: 
** focus on checking bedtime and fasting sugars Fasting  (less than 150) Other times -  (less than 180). Cholesterol  
continue rosuvastatin - 10 mg - continue taking 1/2 tablet Blood pressure: 
Losartan 50 mg - at bedtime We will follow. Introducing [facility]! Dear Hakan Acuña: 
Thank you for requesting a [employer] account. Our records indicate that you already have an active [employer] account. You can access your account anytime at [email or web address] Did you know that you can access your hospital and ER discharge instructions at any time in [employer]? You can also review all of your test results from your hospital stay or ER visit. Additional Information If you have questions, please visit the Frequently Asked Questions section of the [employer] website at [email or web address] Remember, [employer] is NOT to be used for urgent needs. For medical emergencies, dial 911. Now available from your iPhone and Android! Please provide this summary of care documentation to your next provider. Your primary care clinician is listed as Asiya Magdaleno. If you have any questions after today's visit, please call [phone].

## 2018-03-27 NOTE — PATIENT INSTRUCTIONS
Diabetes:  Continue efforts with weight loss. Add walking    Continue Bydureon  Continue Invokana 100 mg  Continue metformin - Take 1000 mg twice daily  Continue Lantus - -continue 36 units daily. Goals for blood sugars:  ** focus on checking bedtime and fasting sugars  Fasting  (less than 150)  Other times -  (less than 180). Cholesterol   continue rosuvastatin - 10 mg - continue taking 1/2 tablet    Blood pressure:  Losartan 50 mg - at bedtime  We will follow.

## 2018-03-27 NOTE — PROGRESS NOTES
History of Present Illness: Lelia Wagoner is a 78 y.o. male presents for follow-up of diabetes. He also has HTN and dyslipidemia and JOSEPH. He has had diabetes since 2008. A1c 6.9 with pre-labs    Current diabetes regimen:  Metformin 1 g BID   Lantus: 36 units  Bydureon weekly - started 9/2013 - does have some nausea with this and also has some problems with reflux at night. Invokana 100 mg daily    Glucoses:  Mostly checks fasting - values typically in low 100s - range from 110s to 144 recently  Values at other times 123-174    He denies any low glucoses. Diet:   Eating smaller portions. Exercise - still with no dedicated exercise. Reports that he simply 'feels lazy'    Using CPAP. Feels this is still not leading to restfull sleep     Wt - has maintained wt loss. Lipids:  Rosuvastatin 10 mg daily-taking one half tablet daily. Blood pressure:  Losartan 50 mg - taking at bedtime. BP higher today. Reflux - problematic. Off PPI, but sx are not fully controlled with famotidine. Taking OTC reflux medication at bedtime. 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 Past Medical History:   Diagnosis Date    Cancer ([facility] [address]     melanoma shoulder right,basal cell cancer nose    Diabetes (HCC)     GERD (gastroesophageal reflux disease)     HTN (hypertension)     Hyperlipemia     Ill-defined condition     insomnia    Sleep apnea     cpap     Current Outpatient Prescriptions   Medication Sig    metFORMIN (GLUCOPHAGE) 1,000 mg tablet TAKE 1 TABLET BY MOUTH TWICE A DAY WITH FOOD    exenatide microspheres (BYDUREON) 2 mg/0.65 mL pnij 2 mg by SubCUTAneous route every seven (7) days.  Lancing Device misc Monitor blood sugar twice daily. Diagnosis code: E11.8    Blood-Glucose Meter (ONETOUCH ULTRA2) monitoring kit Monitor blood sugar twice daily.  Diagnosis code: E11.9    lancets (ONE TOUCH DELICA) 33 gauge misc Monitor blood sugar twice daily. Diagnosis code: E11.9    ONETOUCH ULTRA TEST strip USE AS DIRECTED TWICE A DAY Dx: E11.9    losartan (COZAAR) 50 mg tablet Take 1 Tab by mouth nightly.  famotidine (PEPCID) 20 mg tablet TAKE 1 TAB BY MOUTH TWO (2) TIMES A DAY.  INVOKANA 100 mg tablet TAKE 1 TAB BY MOUTH DAILY (BEFORE BREAKFAST).  rosuvastatin (CRESTOR) 10 mg tablet TAKE 1 TAB BY MOUTH NIGHTLY.  LANTUS 100 unit/mL injection INJECT 36 UNITS SUBCUTANEOUSLY EVERY NIGHT    insulin glargine (LANTUS) 100 unit/mL injection INJECT 36 UNITS SUBCUTANEOUSLY EVERY NIGHT    acetaminophen (TYLENOL) 325 mg tablet Take  by mouth every four (4) hours as needed for Pain.  cyanocobalamin (VITAMIN B-12) 1,000 mcg tablet Take 1,000 mcg by mouth daily.  MELATONIN, BULK, Take 3 mg by mouth nightly as needed.  BD INSULIN SYRINGE ULTRA-FINE 1 mL 31 x 5/16\" syrg USING ONCE DAILY WITH LANTUS    Insulin Needles, Disposable, (TITI PEN NEEDLE) 32 x 5/32 \" Ndle Twice daily    aspirin 81 mg tablet Take 81 mg by mouth.  CELECOXIB (CELEBREX PO) Take 200 mg by mouth. Every other day     No current facility-administered medications for this visit.       No Known Allergies    Review of Systems:  - Eyes: no blurry vision or double vision  - Cardiovascular: no chest pain  - Respiratory: no shortness of breath  - Musculoskeletal: no myalgias  - Neurological: no numbness/tingling in extremities    Physical Examination:  Visit Vitals    /77    Pulse 71    Ht 5' 10\" (1.778 m)    Wt 197 lb (89.4 kg)    BMI 28.27 kg/m2   -   - General: pleasant, no distress, normal gait   HEENT: hearing intact, EOMI, clear sclera without icterus  - Cardiovascular: regular, normal rate   - Respiratory: normal effort  - Integumentary: no edema  - Psychiatric: normal mood and affect    Data Reviewed:      Component      Latest Ref Rng & Units 2/13/2018 2/13/2018 2/13/2018 2/13/2018           7:53 AM  7:53 AM  7:53 AM  7:53 AM   Glucose 65 - 99 mg/dL   128 (H)    BUN      8 - 27 mg/dL   16    Creatinine      0.76 - 1.27 mg/dL   1.20    GFR est non-AA      >59 mL/min/1.73   59 (L)    GFR est AA      >59 mL/min/1.73   68    BUN/Creatinine ratio      10 - 24   13    Sodium      134 - 144 mmol/L   141    Potassium      3.5 - 5.2 mmol/L   4.2    Chloride      96 - 106 mmol/L   100    CO2      18 - 29 mmol/L   24    Calcium      8.6 - 10.2 mg/dL   10.0    Protein, total      6.0 - 8.5 g/dL   7.2    Albumin      3.5 - 4.8 g/dL   4.6    GLOBULIN, TOTAL      1.5 - 4.5 g/dL   2.6    A-G Ratio      1.2 - 2.2   1.8    Bilirubin, total      0.0 - 1.2 mg/dL   0.5    Alk. phosphatase      39 - 117 IU/L   56    AST      0 - 40 IU/L   23    ALT (SGPT)      0 - 44 IU/L   24    Cholesterol, total      100 - 199 mg/dL    120   Triglyceride      0 - 149 mg/dL    197 (H)   HDL Cholesterol      >39 mg/dL    35 (L)   VLDL, calculated      5 - 40 mg/dL    39   LDL, calculated      0 - 99 mg/dL    46   Creatinine, urine      Not Estab. mg/dL  88.3     Microalbumin, urine      Not Estab. ug/mL  23.9     Microalbumin/Creat. Ratio      0.0 - 30.0 mg/g creat  27.1     Hemoglobin A1c, (calculated)      4.8 - 5.6 %       Estimated average glucose      mg/dL       TSH      0.450 - 4.500 uIU/mL 2.510        emoglobin A1c, (calculated)      4.8 - 5.6 % 6.9 (H)   Estimated average glucose      mg/dL 151   TSH      0.450 - 4.500 uIU/mL        Assessment/Plan:   1. Type 2 diabetes mellitus with complication, with long-term current use of insulin (HCC)   - overall controlled  - continue Lantus 36 units daily, Bydureon, metformin and Invokana  - encouraged continued dietary efforts. Again encouraged him to add in some exercise most days. 2. Essential hypertension   Higher today. Continue losartan. We will follow. 3. Dyslipidemia   - continue rosuvastatin and efforts with weight loss. 4. BMI 28.0-28.9,adult   - continue dietary efforts and exercise efforts   5.  Gastroesophageal reflux disease, esophagitis presence not specified   - will try increasing famotidine to 40 mg twice daily. Patient Instructions   Diabetes:  Continue efforts with weight loss. Add walking    Continue Bydureon  Continue Invokana 100 mg  Continue metformin - Take 1000 mg twice daily  Continue Lantus - -continue 36 units daily. Goals for blood sugars:  ** focus on checking bedtime and fasting sugars  Fasting  (less than 150)  Other times -  (less than 180). Cholesterol   continue rosuvastatin - 10 mg - continue taking 1/2 tablet    Blood pressure:  Losartan 50 mg - at bedtime  We will follow. Follow-up Disposition:  Return in about 4 months (around 7/27/2018).     Copy sent to:

## 2018-04-23 RX ORDER — ROSUVASTATIN CALCIUM 10 MG/1
TABLET, COATED ORAL
Qty: 90 TAB | Refills: 3 | Status: SHIPPED | OUTPATIENT
Start: 2018-04-23 | End: 2018-07-11 | Stop reason: ALTCHOICE

## 2018-07-06 LAB
ALBUMIN SERPL-MCNC: 4.3 G/DL (ref 3.5–4.8)
ALBUMIN/CREAT UR: 30.3 MG/G CREAT (ref 0–30)
ALBUMIN/GLOB SERPL: 1.9 {RATIO} (ref 1.2–2.2)
ALP SERPL-CCNC: 58 IU/L (ref 39–117)
ALT SERPL-CCNC: 22 IU/L (ref 0–44)
AST SERPL-CCNC: 21 IU/L (ref 0–40)
BILIRUB SERPL-MCNC: <0.2 MG/DL (ref 0–1.2)
BUN SERPL-MCNC: 15 MG/DL (ref 8–27)
BUN/CREAT SERPL: 12 (ref 10–24)
CALCIUM SERPL-MCNC: 9.5 MG/DL (ref 8.6–10.2)
CHLORIDE SERPL-SCNC: 104 MMOL/L (ref 96–106)
CHOLEST SERPL-MCNC: 121 MG/DL (ref 100–199)
CO2 SERPL-SCNC: 20 MMOL/L (ref 20–29)
CREAT SERPL-MCNC: 1.23 MG/DL (ref 0.76–1.27)
CREAT UR-MCNC: 99.4 MG/DL
EST. AVERAGE GLUCOSE BLD GHB EST-MCNC: 146 MG/DL
GLOBULIN SER CALC-MCNC: 2.3 G/DL (ref 1.5–4.5)
GLUCOSE SERPL-MCNC: 133 MG/DL (ref 65–99)
HBA1C MFR BLD: 6.7 % (ref 4.8–5.6)
HDLC SERPL-MCNC: 28 MG/DL
LDLC SERPL CALC-MCNC: 18 MG/DL (ref 0–99)
MICROALBUMIN UR-MCNC: 30.1 UG/ML
POTASSIUM SERPL-SCNC: 4.2 MMOL/L (ref 3.5–5.2)
PROT SERPL-MCNC: 6.6 G/DL (ref 6–8.5)
SODIUM SERPL-SCNC: 140 MMOL/L (ref 134–144)
TRIGL SERPL-MCNC: 377 MG/DL (ref 0–149)
VLDLC SERPL CALC-MCNC: 75 MG/DL (ref 5–40)

## 2018-07-11 ENCOUNTER — OFFICE VISIT (OUTPATIENT)
Dept: ENDOCRINOLOGY | Age: 78
End: 2018-07-11

## 2018-07-11 VITALS
WEIGHT: 190.2 LBS | HEIGHT: 70 IN | HEART RATE: 74 BPM | SYSTOLIC BLOOD PRESSURE: 118 MMHG | BODY MASS INDEX: 27.23 KG/M2 | DIASTOLIC BLOOD PRESSURE: 75 MMHG

## 2018-07-11 DIAGNOSIS — E11.8 TYPE 2 DIABETES MELLITUS WITH COMPLICATION, WITH LONG-TERM CURRENT USE OF INSULIN (HCC): Primary | ICD-10-CM

## 2018-07-11 DIAGNOSIS — E78.5 DYSLIPIDEMIA: ICD-10-CM

## 2018-07-11 DIAGNOSIS — Z79.4 TYPE 2 DIABETES MELLITUS WITH COMPLICATION, WITH LONG-TERM CURRENT USE OF INSULIN (HCC): Primary | ICD-10-CM

## 2018-07-11 DIAGNOSIS — I10 ESSENTIAL HYPERTENSION: ICD-10-CM

## 2018-07-11 RX ORDER — ROSUVASTATIN CALCIUM 5 MG/1
5 TABLET, COATED ORAL DAILY
Qty: 90 TAB | Refills: 3 | Status: SHIPPED | OUTPATIENT
Start: 2018-07-11 | End: 2019-09-12 | Stop reason: SDUPTHER

## 2018-07-11 NOTE — PATIENT INSTRUCTIONS
Diabetes:  Continue efforts with weight loss. Continue efforts to increase exercise. ** See if Ozempic is covered. Start with 0.25 mg weekly for 4 weeks, then increase to 0.5 mg weekly. Keep in mind it may take 8 weeks for Bydureon to completely get out of your system. Continue Invokana 100 mg  Continue metformin - Take 1000 mg twice daily  Continue Lantus - -continue 36 units daily. ** Lower dose should you have values < 90. Would lower dose to 32 units. Goals for blood sugars:  ** focus on checking bedtime and fasting sugars  Fasting  (less than 150)  Other times -  (less than 180). Cholesterol   continue rosuvastatin - 5 mg    Blood pressure:  Losartan 50 mg - at bedtime    If you feel dizzy, or tired check blood pressure.  If < 110 for top number with symptoms, let me know, but hold losartan

## 2018-07-11 NOTE — MR AVS SNAPSHOT
[address] [facility] [address] [address] [phone] Patient: Diane Guillen MRN: [id number] ETP:3/5/[birth year]37 Visit Information Date & Time Provider Department Dept. Phone Encounter #  
 7/11/2018  1:50 PM Berkley Miller, [address] [place] [address] Diabetes and Endocrinology [phone] Follow-up Instructions Return in about 4 months (around 11/11/2018). Upcoming Health Maintenance Date Due DTaP/Tdap/Td series (1 - Tdap) 9/5/1963 ZOSTER VACCINE AGE 60> 7/5/2002 GLAUCOMA SCREENING Q2Y 9/5/2007 Pneumococcal 65+ Low/Medium Risk (1 of 2 - PCV13) 9/5/2007 EYE EXAM RETINAL OR DILATED Q1 11/12/2015 MEDICARE YEARLY EXAM 3/14/2018 Influenza Age 5 to Adult 8/1/2018 FOOT EXAM Q1 10/11/2018 HEMOGLOBIN A1C Q6M 1/5/2019 MICROALBUMIN Q1 7/5/2019 LIPID PANEL Q1 7/5/2019 Allergies as of 7/11/2018  Review Complete On: 7/11/2018 By: Kevin Limon LPN No Known Allergies Current Immunizations  Never Reviewed No immunizations on file. Not reviewed this visit You Were Diagnosed With   
  
 Codes Comments Type 2 diabetes mellitus with complication, with long-term current use of insulin (HCC)    -  Primary ICD-10-CM: E11.8, Z79.4 ICD-9-CM: 250.90, V58.67 Essential hypertension     ICD-10-CM: I10 
ICD-9-CM: 401.9 Dyslipidemia     ICD-10-CM: E78.5 ICD-9-CM: 272.4 BMI 27.0-27.9,adult     ICD-10-CM: Q41.53 ICD-9-CM: V85.23 Vitals BP Pulse Height(growth percentile) Weight(growth percentile) BMI Smoking Status 118/75 (BP 1 Location: Left arm, BP Patient Position: Sitting) 74 5' 10\" (1.778 m) 190 lb 3.2 oz (86.3 kg) 27.29 kg/m2 Never Smoker Vitals History BMI and BSA Data Body Mass Index Body Surface Area  
 27.29 kg/m 2 2.06 m 2 Preferred Pharmacy Pharm[facility] Name Phone [id number] [phone] Your Updated Medication List  
  
   
This list is accurate as of 7/11/18  2:47 PM.  Always use your most recent med list.  
  
  
  
  
 aspirin 81 mg tablet Take 81 mg by mouth. BD INSULIN SYRINGE ULTRA-FINE 1 mL 31 gauge x 5/16 Syrg Generic drug:  Insulin Syringe-Needle U-100 USING ONCE DAILY WITH LANTUS Blood-Glucose Meter monitoring kit Commonly known as:  [employer] Monitor blood sugar twice daily. Diagnosis code: E11.9  
  
 canagliflozin 100 mg tablet Commonly known as:  Josue Fines Take 1 Tab by mouth Daily (before breakfast). CELEBREX PO Take 200 mg by mouth. Every other day  
  
 famotidine 40 mg tablet Commonly known as:  PEPCID Take 1 Tab by mouth two (2) times a day. insulin glargine 100 unit/mL injection Commonly known as:  LANTUS U-100 INSULIN INJECT 36 UNITS SUBCUTANEOUSLY EVERY NIGHT Insulin Needles (Disposable) 32 gauge x 5/32\" Ndle Commonly known as:  Silvia Pen Needle Twice daily  
  
 lancets 33 gauge Misc Commonly known as: One Touch Andrez Amor Monitor blood sugar twice daily. Diagnosis code: E11.9 Lancing Device Misc Monitor blood sugar twice daily. Diagnosis code: E11.8  
  
 losartan 50 mg tablet Commonly known as:  COZAAR Take 1 Tab by mouth nightly. MELATONIN (BULK) Take 3 mg by mouth nightly as needed. metFORMIN 1,000 mg tablet Commonly known as:  GLUCOPHAGE  
TAKE 1 TABLET BY MOUTH TWICE A DAY WITH FOOD  
  
 ONETOUCH ULTRA BLUE TEST STRIP strip Generic drug:  glucose blood VI test strips USE AS DIRECTED TWICE A DAY E11.9  
  
 rosuvastatin 5 mg tablet Commonly known as:  CRESTOR Take 1 Tab by mouth daily. semaglutide 0.25 mg/0.2 mL (2 mg/1.5 mL) sub-q pen Commonly known as:  OZEMPIC  
0.5 mg by SubCUTAneous route every seven (7) days. TYLENOL 325 mg tablet Generic drug:  acetaminophen Take  by mouth every four (4) hours as needed for Pain. VITAMIN B-12 1,000 mcg tablet Generic drug:  cyanocobalamin Take 1,000 mcg by mouth daily. Prescriptions Sent to Pharmacy Refills  
 rosuvastatin (CRESTOR) 5 mg tablet 3 Sig: Take 1 Tab by mouth daily. Class: Normal  
 Pharmacy: [address] #: [phone] Route: Oral  
 semaglutide (OZEMPIC) 0.25 mg/0.2 mL (2 mg/1.5 mL) sub-q pen 11 Si.5 mg by SubCUTAneous route every seven (7) days. Class: Normal  
 Pharmacy: [address] #: [phone] Route: SubCUTAneous Follow-up Instructions Return in about 4 months (around 2018). Patient Instructions Diabetes: 
Continue efforts with weight loss. Continue efforts to increase exercise. ** See if Ozempic is covered. Start with 0.25 mg weekly for 4 weeks, then increase to 0.5 mg weekly. Keep in mind it may take 8 weeks for Bydureon to completely get out of your system. Continue Invokana 100 mg 
Continue metformin - Take 1000 mg twice daily Continue Lantus - -continue 36 units daily. ** Lower dose should you have values < 90. Would lower dose to 32 units. Goals for blood sugars: 
** focus on checking bedtime and fasting sugars Fasting  (less than 150) Other times -  (less than 180). Cholesterol  
continue rosuvastatin - 5 mg Blood pressure: 
Losartan 50 mg - at bedtime If you feel dizzy, or tired check blood pressure. If < 110 for top number with symptoms, let me know, but hold losartan Introducing [facility]! Dear Amanda Hurt: 
Thank you for requesting a [employer] account. Our records indicate that you already have an active [employer] account.   You can access your account anytime at [email or web address] Did you know that you can access your hospital and ER discharge instructions at any time in [employer]? You can also review all of your test results from your hospital stay or ER visit. Additional Information If you have questions, please visit the Frequently Asked Questions section of the [employer] website at [email or web address] Remember, [employer] is NOT to be used for urgent needs. For medical emergencies, dial 911. Now available from your iPhone and Android! Please provide this summary of care documentation to your next provider. Your primary care clinician is listed as Eileen Srivastava. If you have any questions after today's visit, please call [phone].

## 2018-07-11 NOTE — PROGRESS NOTES
History of Present Illness: Cornel Mesa is a 78 y.o. male presents with for follow-up of diabetes. He also has HTN and dyslipidemia and JOSEPH. He has had diabetes since 2008. A1c 6.9 with pre-labs    Current diabetes regimen:  Metformin 1 g BID   Lantus: 36 units  Bydureon weekly - started 9/2013 - does have some nausea with this and also has some problems with reflux at night. Invokana 100 mg daily    Glucoses: He brings glucose log. He is mostly checking fasting glucoses. These are typically in the 100-140 range. Values were lower and in the 110s when he was medication and doing more walking. If he monitors later in the day, values can be as high as 188. He denies any low glucoses. No values less than 98    Diet:   Eating smaller portions. Exercise - still with no dedicated exercise. Using CPAP. Wt - weight is down 5-7 lbs over last year. Lipids:  Rosuvastatin 10 mg daily-taking one half tablet daily. Blood pressure:  Losartan 50 mg - taking at bedtime. Blood pressure better controlled. He reports feeling tired and sluggish at times. He does not monitor blood pressure at home. Reflux - problematic. Off PPI, but sx are not fully controlled with famotidine. Taking OTC reflux medication at bedtime. 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 Past Medical History:   Diagnosis Date    Cancer [facility])     melanoma shoulder right,basal cell cancer nose    Diabetes (HCC)     GERD (gastroesophageal reflux disease)     HTN (hypertension)     Hyperlipemia     Ill-defined condition     insomnia    Sleep apnea     cpap     Current Outpatient Prescriptions   Medication Sig    ONETOUCH ULTRA BLUE TEST STRIP strip USE AS DIRECTED TWICE A DAY E11.9    rosuvastatin (CRESTOR) 10 mg tablet TAKE 1 TAB BY MOUTH NIGHTLY.     exenatide microspheres (BYDUREON) 2 mg/0.65 mL pnij 2 mg by SubCUTAneous route every seven (7) days.  canagliflozin (INVOKANA) 100 mg tablet Take 1 Tab by mouth Daily (before breakfast).  famotidine (PEPCID) 40 mg tablet Take 1 Tab by mouth two (2) times a day.  metFORMIN (GLUCOPHAGE) 1,000 mg tablet TAKE 1 TABLET BY MOUTH TWICE A DAY WITH FOOD    Lancing Device misc Monitor blood sugar twice daily. Diagnosis code: E11.8    Blood-Glucose Meter (ONETOUCH ULTRA2) monitoring kit Monitor blood sugar twice daily. Diagnosis code: E11.9    lancets (ONE TOUCH DELICA) 33 gauge misc Monitor blood sugar twice daily. Diagnosis code: E11.9    losartan (COZAAR) 50 mg tablet Take 1 Tab by mouth nightly.  LANTUS 100 unit/mL injection INJECT 36 UNITS SUBCUTANEOUSLY EVERY NIGHT (Patient taking differently: INJECT 36 UNITS SUBCUTANEOUSLY EVERY MORNING)    insulin glargine (LANTUS) 100 unit/mL injection INJECT 36 UNITS SUBCUTANEOUSLY EVERY NIGHT (Patient taking differently: INJECT 36 UNITS SUBCUTANEOUSLY EVERY MORNING)    acetaminophen (TYLENOL) 325 mg tablet Take  by mouth every four (4) hours as needed for Pain.  cyanocobalamin (VITAMIN B-12) 1,000 mcg tablet Take 1,000 mcg by mouth daily.  MELATONIN, BULK, Take 3 mg by mouth nightly as needed.  BD INSULIN SYRINGE ULTRA-FINE 1 mL 31 x 5/16\" syrg USING ONCE DAILY WITH LANTUS    Insulin Needles, Disposable, (TITI PEN NEEDLE) 32 x 5/32 \" Ndle Twice daily    aspirin 81 mg tablet Take 81 mg by mouth.  CELECOXIB (CELEBREX PO) Take 200 mg by mouth. Every other day     No current facility-administered medications for this visit.       No Known Allergies    Review of Systems:  - Eyes: no blurry vision or double vision  - Cardiovascular: no chest pain  - Respiratory: no shortness of breath  - Musculoskeletal: no myalgias  - Neurological: no numbness/tingling in extremities    Physical Examination:  Visit Vitals    /75 (BP 1 Location: Left arm, BP Patient Position: Sitting)    Pulse 74    Ht 5' 10\" (1.778 m)    Wt 190 lb 3.2 oz (86.3 kg)    BMI 27.29 kg/m2   -   - General: pleasant, no distress, normal gait   HEENT: hearing intact, EOMI, clear sclera without icterus  - Cardiovascular: regular, normal rate   - Respiratory: normal effort  - Integumentary: no edema  - Psychiatric: normal mood and affect    Data Reviewed:   Component      Latest Ref Rng & Units 7/5/2018 7/5/2018 7/5/2018 7/5/2018           8:05 AM  8:05 AM  8:05 AM  8:05 AM   Glucose      65 - 99 mg/dL    133 (H)   BUN      8 - 27 mg/dL    15   Creatinine      0.76 - 1.27 mg/dL    1.23   GFR est non-AA      >59 mL/min/1.73    57 (L)   GFR est AA      >59 mL/min/1.73    66   BUN/Creatinine ratio      10 - 24    12   Sodium      134 - 144 mmol/L    140   Potassium      3.5 - 5.2 mmol/L    4.2   Chloride      96 - 106 mmol/L    104   CO2      20 - 29 mmol/L    20   Calcium      8.6 - 10.2 mg/dL    9.5   Protein, total      6.0 - 8.5 g/dL    6.6   Albumin      3.5 - 4.8 g/dL    4.3   GLOBULIN, TOTAL      1.5 - 4.5 g/dL    2.3   A-G Ratio      1.2 - 2.2    1.9   Bilirubin, total      0.0 - 1.2 mg/dL    <0.2   Alk. phosphatase      39 - 117 IU/L    58   AST      0 - 40 IU/L    21   ALT (SGPT)      0 - 44 IU/L    22   Cholesterol, total      100 - 199 mg/dL  121     Triglyceride      0 - 149 mg/dL  377 (H)     HDL Cholesterol      >39 mg/dL  28 (L)     VLDL, calculated      5 - 40 mg/dL  75 (H)     LDL, calculated      0 - 99 mg/dL  18     Creatinine, urine      Not Estab. mg/dL 99.4      Microalbumin, urine      Not Estab. ug/mL 30.1      Microalbumin/Creat. Ratio      0.0 - 30.0 mg/g creat 30.3 (H)      Hemoglobin A1c, (calculated)      4.8 - 5.6 %   6.7 (H)    Estimated average glucose      mg/dL   146        Assessment/Plan:   1. Type 2 diabetes mellitus with complication, with long-term current use of insulin (HCC)   Overall control is good. He has been taking Bydureon for several years now.   Reviewed with him that any medication for the same class called Ozempic, slightly more efficacious. A direct comparison of the 2 showed that Ozempic lowered hemoglobin A1c substantially greater than Bydureon and associated with 3 times the weight loss. Additionally, reviewed that the Ozempic needle is substantially smaller and the injection is more simple, straightforward, and quick. If covered, transition to Ozempic 0.25 mg weekly for 4 weeks, then increase to 0.5 mg weekly. Reviewed how the Bydureon will have lingering effects for up to 8 weeks. Continue metformin and Invokana  Reviewed with him the need to follow glucoses and decrease Lantus should he have glucoses less than 90   2. Essential hypertension   Continue losartan at bedtime. Recommend he monitor blood pressure at home. If he has blood pressure values less than 808 systolic and associated symptoms, we may need to discontinue losartan   3. Dyslipidemia   Continue atorvastatin  Encourage weight loss and exercise efforts to improve triglycerides and HDL   4. BMI 27.0-27.9,adult   Trying to optimize medications for weight loss-see above encouraged him to increase his exercise. Patient Instructions   Diabetes:  Continue efforts with weight loss. Continue efforts to increase exercise. ** See if Ozempic is covered. Start with 0.25 mg weekly for 4 weeks, then increase to 0.5 mg weekly. Keep in mind it may take 8 weeks for Bydureon to completely get out of your system. Continue Invokana 100 mg  Continue metformin - Take 1000 mg twice daily  Continue Lantus - -continue 36 units daily. ** Lower dose should you have values < 90. Would lower dose to 32 units. Goals for blood sugars:  ** focus on checking bedtime and fasting sugars  Fasting  (less than 150)  Other times -  (less than 180). Cholesterol   continue rosuvastatin - 5 mg    Blood pressure:  Losartan 50 mg - at bedtime    If you feel dizzy, or tired check blood pressure.  If < 110 for top number with symptoms, let me know, but hold losartan        Follow-up Disposition:  Return in about 4 months (around 11/11/2018).     Copy sent to:

## 2018-08-01 RX ORDER — INSULIN GLARGINE 100 [IU]/ML
INJECTION, SOLUTION SUBCUTANEOUS
Qty: 20 ML | Refills: 3 | Status: SHIPPED | OUTPATIENT
Start: 2018-08-01 | End: 2019-03-18 | Stop reason: SDUPTHER

## 2018-10-16 RX ORDER — LOSARTAN POTASSIUM 50 MG/1
TABLET ORAL
Qty: 90 TAB | Refills: 3 | Status: SHIPPED | OUTPATIENT
Start: 2018-10-16 | End: 2019-09-20 | Stop reason: SDUPTHER

## 2018-11-06 LAB
ALBUMIN/CREAT UR: 25.3 MG/G CREAT (ref 0–30)
BUN SERPL-MCNC: 16 MG/DL (ref 8–27)
BUN/CREAT SERPL: 14 (ref 10–24)
CALCIUM SERPL-MCNC: 9.4 MG/DL (ref 8.6–10.2)
CHLORIDE SERPL-SCNC: 101 MMOL/L (ref 96–106)
CHOLEST SERPL-MCNC: 118 MG/DL (ref 100–199)
CO2 SERPL-SCNC: 24 MMOL/L (ref 20–29)
CREAT SERPL-MCNC: 1.17 MG/DL (ref 0.76–1.27)
CREAT UR-MCNC: 100.9 MG/DL
EST. AVERAGE GLUCOSE BLD GHB EST-MCNC: 148 MG/DL
GLUCOSE SERPL-MCNC: 114 MG/DL (ref 65–99)
HBA1C MFR BLD: 6.8 % (ref 4.8–5.6)
HDLC SERPL-MCNC: 29 MG/DL
INTERPRETATION, 910389: NORMAL
LDLC SERPL CALC-MCNC: 48 MG/DL (ref 0–99)
Lab: NORMAL
MICROALBUMIN UR-MCNC: 25.5 UG/ML
POTASSIUM SERPL-SCNC: 4.2 MMOL/L (ref 3.5–5.2)
SODIUM SERPL-SCNC: 138 MMOL/L (ref 134–144)
TRIGL SERPL-MCNC: 203 MG/DL (ref 0–149)
VLDLC SERPL CALC-MCNC: 41 MG/DL (ref 5–40)

## 2018-11-14 ENCOUNTER — OFFICE VISIT (OUTPATIENT)
Dept: ENDOCRINOLOGY | Age: 78
End: 2018-11-14

## 2018-11-14 VITALS
OXYGEN SATURATION: 95 % | BODY MASS INDEX: 26.54 KG/M2 | DIASTOLIC BLOOD PRESSURE: 83 MMHG | WEIGHT: 185.4 LBS | SYSTOLIC BLOOD PRESSURE: 117 MMHG | HEART RATE: 77 BPM | HEIGHT: 70 IN | RESPIRATION RATE: 14 BRPM

## 2018-11-14 DIAGNOSIS — E11.8 TYPE 2 DIABETES MELLITUS WITH COMPLICATION, WITH LONG-TERM CURRENT USE OF INSULIN (HCC): Primary | ICD-10-CM

## 2018-11-14 DIAGNOSIS — Z79.4 TYPE 2 DIABETES MELLITUS WITH COMPLICATION, WITH LONG-TERM CURRENT USE OF INSULIN (HCC): Primary | ICD-10-CM

## 2018-11-14 DIAGNOSIS — I10 ESSENTIAL HYPERTENSION: ICD-10-CM

## 2018-11-14 DIAGNOSIS — E78.5 DYSLIPIDEMIA: ICD-10-CM

## 2018-11-14 NOTE — PROGRESS NOTES
Iona Victoria is a 70 y.o. male Chief Complaint Patient presents with  Follow-up  Diabetes  Labs Labs in [place] [address] Other Eye exam done. 1. Have you been to the ER, urgent care clinic since your last visit? Hospitalized since your last visit? No 
 
2. Have you seen or consulted any other health care providers outside of the [address] [place] [address] since your last visit? Include any pap smears or colon screening.  No

## 2018-11-14 NOTE — PATIENT INSTRUCTIONS
Diabetes: 
Continue efforts with weight loss. Continue efforts to increase exercise. Continue Invokana 100 mg 
Continue metformin - Take 1000 mg twice daily Increase Ozempic to 0.5 mg weekly. Continue Lantus - -continue 36 units daily. ** Lower dose should you have values < 90. Would lower dose to 30 units, then decrease further in 5 unit increments if values are again < 90. Goals for blood sugars: 
** focus on checking bedtime and fasting sugars Fasting  (less than 150) Other times -  (less than 180). Cholesterol  
continue rosuvastatin - 5 mg Blood pressure: 
Losartan 50 mg - at bedtime If you feel dizzy, or tired check blood pressure. If < 110 for top number with symptoms, let me know, but hold losartan

## 2018-11-14 NOTE — PROGRESS NOTES
History of Present Illness: Remington Murray is a 70 y.o. male presents for follow-up of diabetes He also has HTN and dyslipidemia and JOSEPH. He has had diabetes since 2008. A1c 6.8 with pre-labs Current diabetes regimen: 
Metformin 1 g BID Lantus: 36 units Since last visit changed from Bydureon to [address] - only taking 0.25 mg weekly now Invokana 100 mg daily Glucoses: 
Glucoses were higher when he took Medrol dosepak in 10/2018 - as high as 185 fasting for a few days Now fasting values  fasting Evening values sometimes as good as 130-150, others in 180s Diet:  
Eating smaller portions. Keeping starch intake down. Having smoothie for lunch. Generally eats a very smaller dinner. Does eat snacks around 9:30pm 
 
Exercise - still with no dedicated exercise. Using CPAP. Wt - weight is down 10 lbs since last visit. Lipids:  Rosuvastatin 10 mg daily-taking one half tablet daily. Triglycerides improving with weight loss Blood pressure:  Losartan 50 mg - taking at bedtime. Reflux - problematic. Off PPI, but sx are not fully controlled with famotidine. Taking OTC reflux medication at bedtime. Social: 
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 Past Medical History:  
Diagnosis Date  Cancer ([facility] [address] melanoma shoulder right,basal cell cancer nose  Diabetes ([facility] [address]  GERD (gastroesophageal reflux disease)  HTN (hypertension)  Hyperlipemia  Ill-defined condition   
 insomnia  Sleep apnea   
 cpap Current Outpatient Medications Medication Sig  
 losartan (COZAAR) 50 mg tablet TAKE 1 TAB BY MOUTH NIGHTLY.  LANTUS U-100 INSULIN 100 unit/mL injection INJECT 36 UNITS SUBCUTANEOUSLY EVERY NIGHT  rosuvastatin (CRESTOR) 5 mg tablet Take 1 Tab by mouth daily.  semaglutide (OZEMPIC) 0.25 mg/0.2 mL (2 mg/1.5 mL) sub-q pen 0.5 mg by SubCUTAneous route every seven (7) days.  ONETOUCH ULTRA BLUE TEST STRIP strip USE AS DIRECTED TWICE A DAY E11.9  canagliflozin (INVOKANA) 100 mg tablet Take 1 Tab by mouth Daily (before breakfast).  famotidine (PEPCID) 40 mg tablet Take 1 Tab by mouth two (2) times a day.  metFORMIN (GLUCOPHAGE) 1,000 mg tablet TAKE 1 TABLET BY MOUTH TWICE A DAY WITH FOOD  Lancing Device misc Monitor blood sugar twice daily. Diagnosis code: E11.8  Blood-Glucose Meter (ONETOUCH ULTRA2) monitoring kit Monitor blood sugar twice daily. Diagnosis code: E11.9  
 lancets (ONE TOUCH DELICA) 33 gauge misc Monitor blood sugar twice daily. Diagnosis code: E11.9  
 acetaminophen (TYLENOL) 325 mg tablet Take  by mouth every four (4) hours as needed for Pain.  cyanocobalamin (VITAMIN B-12) 1,000 mcg tablet Take 1,000 mcg by mouth daily.  MELATONIN, BULK, Take 3 mg by mouth nightly as needed.  BD INSULIN SYRINGE ULTRA-FINE 1 mL 31 x 5/16\" syrg USING ONCE DAILY WITH LANTUS  
 Insulin Needles, Disposable, (TITI PEN NEEDLE) 32 x 5/32 \" Ndle Twice daily  aspirin 81 mg tablet Take 81 mg by mouth.  CELECOXIB (CELEBREX PO) Take 200 mg by mouth. Every other day No current facility-administered medications for this visit. No Known Allergies Review of Systems: - Eyes: no blurry vision or double vision - Cardiovascular: no chest pain - Respiratory: no shortness of breath - Musculoskeletal: no myalgias - Neurological: no numbness/tingling in extremities Physical Examination: 
Visit Vitals /83 (BP 1 Location: Left arm, BP Patient Position: Sitting) Pulse 77 Resp 14 Ht 5' 10\" (1.778 m) Wt 185 lb 6.4 oz (84.1 kg) SpO2 95% BMI 26.60 kg/m²  
-  
- General: pleasant, no distress, normal gait HEENT: hearing intact, EOMI, clear sclera without icterus - Cardiovascular: regular, normal rate - Respiratory: normal effort - Integumentary: no edema Diabetic foot exam:  
 
Left Foot: 
 Visual Exam: normal  
 Pulse DP: 2+ (normal) Filament test: normal sensation ; 
- Psychiatric: normal mood and affect Data Reviewed:  
Component Latest Ref Rng & Units 11/5/2018 11/5/2018 11/5/2018 11/5/2018  
 
      8:19 AM  8:19 AM  8:19 AM  8:19 AM  
Glucose 65 - 99 mg/dL  114 (H) BUN 
    8 - 27 mg/dL  16    
Creatinine 
    0.76 - 1.27 mg/dL  1.17 GFR est non-AA 
    >59 mL/min/1.73  60 GFR est AA 
    >59 mL/min/1.73  70 BUN/Creatinine ratio 10 - 24  14 Sodium 134 - 144 mmol/L  138 Potassium 3.5 - 5.2 mmol/L  4.2 Chloride 96 - 106 mmol/L  101 CO2 
    20 - 29 mmol/L  24 Calcium 8.6 - 10.2 mg/dL  9.4 Protein, total 
    6.0 - 8.5 g/dL Albumin 3.5 - 4.8 g/dL GLOBULIN, TOTAL 
    1.5 - 4.5 g/dL A-G Ratio 1.2 - 2.2 Bilirubin, total 
    0.0 - 1.2 mg/dL Alk. phosphatase 39 - 117 IU/L      
AST 
    0 - 40 IU/L      
ALT (SGPT) 0 - 44 IU/L Cholesterol, total 
    100 - 199 mg/dL    118 Triglyceride 0 - 149 mg/dL    203 (H) HDL Cholesterol >39 mg/dL    29 (L) VLDL, calculated 5 - 40 mg/dL    41 (H) LDL, calculated 0 - 99 mg/dL    48 Creatinine, urine Not Estab. mg/dL   100.9 Microalbumin, urine Not Estab. ug/mL   25.5 Microalbumin/Creat. Ratio 
    0.0 - 30.0 mg/g creat   25.3 Hemoglobin A1c, (calculated) 4.8 - 5.6 % 6.8 (H) Estimated average glucose 
    mg/dL 148 Assessment/Plan: 1. Type 2 diabetes mellitus with complication, with long-term current use of insulin ([address] Overall control is near goal 
Advised him to increase Ozempic to treatment dose of 0.5 mg weekly Continue Invokana and metformin. Encouraged continued efforts with exercise and dietary efforts Continue Lantus 36 units daily, but advised him to lower Lantus in 5-6 unit increments for low values Encouraged him to avoid snacking late at night 2. Dyslipidemia Continue rosuvastatin 3. Essential hypertension  
 continue losartan 4. BMI 26.0-26.9,adult You have continued success with weight loss reviewed how weight loss can reverse diabetes He has not had any cardiovascular events or stroke, therefore recommend he discontinue the aspirin. Explained that recent studies in people with diabetes and an elderly show that a daily aspirin is not associated with significant decrease in card vascular events, but is associate with an increased in hemorrhage risk Patient Instructions Diabetes: 
Continue efforts with weight loss. Continue efforts to increase exercise. Continue Invokana 100 mg 
Continue metformin - Take 1000 mg twice daily Increase Ozempic to 0.5 mg weekly. Continue Lantus - -continue 36 units daily. ** Lower dose should you have values < 90. Would lower dose to 30 units, then decrease further in 5 unit increments if values are again < 90. Goals for blood sugars: 
** focus on checking bedtime and fasting sugars Fasting  (less than 150) Other times -  (less than 180). Cholesterol  
continue rosuvastatin - 5 mg Blood pressure: 
Losartan 50 mg - at bedtime If you feel dizzy, or tired check blood pressure. If < 110 for top number with symptoms, let me know, but hold losartan Follow-up Disposition: 
Return in about 4 months (around 3/14/2019). Copy sent to:

## 2018-12-31 RX ORDER — METFORMIN HYDROCHLORIDE 1000 MG/1
TABLET ORAL
Qty: 180 TAB | Refills: 3 | Status: SHIPPED | OUTPATIENT
Start: 2018-12-31 | End: 2019-09-11 | Stop reason: SDUPTHER

## 2019-02-26 ENCOUNTER — HOSPITAL ENCOUNTER (OUTPATIENT)
Dept: SLEEP MEDICINE | Age: 79
Discharge: HOME | End: 2019-02-26
Payer: MEDICARE

## 2019-02-26 ENCOUNTER — OFFICE VISIT (OUTPATIENT)
Dept: SLEEP MEDICINE | Age: 79
End: 2019-02-26

## 2019-02-26 VITALS
BODY MASS INDEX: 27.06 KG/M2 | HEIGHT: 70 IN | SYSTOLIC BLOOD PRESSURE: 119 MMHG | HEART RATE: 72 BPM | OXYGEN SATURATION: 97 % | DIASTOLIC BLOOD PRESSURE: 79 MMHG | WEIGHT: 189 LBS

## 2019-02-26 DIAGNOSIS — G47.33 OSA (OBSTRUCTIVE SLEEP APNEA): Primary | ICD-10-CM

## 2019-02-26 PROCEDURE — 95806 SLEEP STUDY UNATT&RESP EFFT: CPT | Performed by: SPECIALIST

## 2019-02-26 NOTE — PATIENT INSTRUCTIONS

## 2019-02-26 NOTE — PROGRESS NOTES
HSAT Setup -[facility]    · Patient was educated on proper hookup and operation of the HSAT. · Instruction forms and documentation were reviewed and signed. · The patient demonstrated good understanding of the HSAT. · General information regarding operations and maintenance of the device was provided. · Patient was provided information on sleep apnea including coresponding risk factors and the importance of proper treatment. · Follow-up appointment was made to return the HSAT. He will be contacted once the results have been reviewed. · Patient was asked to contact our office for any problems regarding his home sleep test study.

## 2019-02-26 NOTE — PROGRESS NOTES
[address] [facility] [place], [address]  Tel.  [phone]  Fax. [address] [facility] [address]  [place], [address] [facility]  Tel.  [phone]  Fax. [phone] [address] [facility] [address]  Tel.  [phone]  Fax. [phone]       Chief Complaint       Chief Complaint   Patient presents with    Sleep Problem     Np self refd on cpap therapy       MIGUEL Hoff is a  70 y.o.  male seen for evaluation of a sleep disorder  . He notes he was initially evaluated at [facility] and diagnosed with sleep apnea. He had been followed at [address] [place] [address]; had last been seen by sleep physician there over 18 months ago. Initial records are not currently available. He is on an A flex unit. During the past 30 days, APAP used during 18 days with the average daily use of 4.5 hours. CMS compliance criteria of 40%. 90% of the time pressure less than 9.1 cm. AHI 3/h. He has been feeling better using APAP. The patient has not undergone recent diagnostic testing for the current problems. [place] Sleepiness Score: 8       No Known Allergies    Current Outpatient Medications   Medication Sig Dispense Refill    metFORMIN (GLUCOPHAGE) 1,000 mg tablet TAKE 1 TABLET BY MOUTH TWICE A DAY WITH FOOD 180 Tab 3    losartan (COZAAR) 50 mg tablet TAKE 1 TAB BY MOUTH NIGHTLY. 90 Tab 3    LANTUS U-100 INSULIN 100 unit/mL injection INJECT 36 UNITS SUBCUTANEOUSLY EVERY NIGHT 20 mL 3    rosuvastatin (CRESTOR) 5 mg tablet Take 1 Tab by mouth daily. 90 Tab 3    semaglutide (OZEMPIC) 0.25 mg/0.2 mL (2 mg/1.5 mL) sub-q pen 0.5 mg by SubCUTAneous route every seven (7) days. [address]    ONETOUCH ULTRA BLUE TEST STRIP strip USE AS DIRECTED TWICE A DAY E11.9 100 Strip 3    canagliflozin (INVOKANA) 100 mg tablet Take 1 Tab by mouth Daily (before breakfast).  90 Tab 3    famotidine (PEPCID) 40 mg tablet Take 1 Tab by mouth two (2) times a day. 180 Tab 3    Lancing Device misc Monitor blood sugar twice daily. Diagnosis code: E11.8 1 Each 0    Blood-Glucose Meter (ONETOUCH ULTRA2) monitoring kit Monitor blood sugar twice daily. Diagnosis code: E11.9 1 Kit 0    lancets (ONE TOUCH DELICA) 33 gauge misc Monitor blood sugar twice daily. Diagnosis code: E11.9 100 Lancet 10    acetaminophen (TYLENOL) 325 mg tablet Take  by mouth every four (4) hours as needed for Pain.  cyanocobalamin (VITAMIN B-12) 1,000 mcg tablet Take 1,000 mcg by mouth daily.  MELATONIN, BULK, Take 3 mg by mouth nightly as needed.  BD INSULIN SYRINGE ULTRA-FINE 1 mL 31 x 5/16\" syrg USING ONCE DAILY WITH LANTUS 100 Syringe 4    Insulin Needles, Disposable, (TITI PEN NEEDLE) 32 x 5/32 \" Ndle Twice daily 100 Each 6    aspirin 81 mg tablet Take 81 mg by mouth. He  has a past medical history of Cancer ([facility] Diabetes ([facility] GERD (gastroesophageal reflux disease), HTN (hypertension), Hyperlipemia, Ill-defined condition, and Sleep apnea. He  has a past surgical history that includes pr hand/finger surgery unlisted; pr trabeculoplasty by laser surgery; hx other surgical; hx other surgical; hx orthopaedic; and upper gi endoscopy,biopsy (1/13/2017). He family history includes Cancer in his mother; Diabetes in his maternal grandfather and sister; Kidney Disease in his paternal grandmother. He  reports that  has never smoked. he has never used smokeless tobacco. He reports that he drinks about 1.8 oz of alcohol per week. He reports that he does not use drugs. Review of Systems:  Review of Systems   Constitutional: Negative for chills and fever. HENT: Positive for sore throat. Negative for hearing loss. Eyes: Negative for blurred vision and double vision. Respiratory: Negative for cough and wheezing. Cardiovascular: Negative for chest pain and palpitations. Gastrointestinal: Negative for abdominal pain.    Genitourinary: Negative for frequency and urgency. Musculoskeletal: Positive for back pain and neck pain. Skin: Positive for itching. Negative for rash. Neurological: Negative for dizziness and headaches. Psychiatric/Behavioral: Negative for depression. Objective:     Visit Vitals  /79   Pulse 72   Ht 5' 10\" (1.778 m)   Wt 189 lb (85.7 kg)   SpO2 97%   BMI 27.12 kg/m²     Body mass index is 27.12 kg/m². General:   Conversant, cooperative   Eyes:  Pupils equal and reactive, no nystagmus   Oropharynx:   Mallampati score I, tongue normal   Tonsils:      Neck:   No carotid bruits; Neck circ. in \"inches\": 16   Chest/Lungs:  Clear on auscultation    CVS:  Normal rate, regular rhythm   Skin:  Warm to touch; no obvious rashes   Neuro:  Speech fluent, face symmetrical, tongue movement normal   Psych:  Normal affect,  normal countenance        Assessment:       ICD-10-CM ICD-9-CM    1. JOSEPH (obstructive sleep apnea) G47.33 327.23      History of sleep disordered breathing; currently reduced compliance. He will be reevaluated with a home sleep test.  Results will be reviewed with him. Plan:     No orders of the defined types were placed in this encounter. * Patient has a history and examination consistent with the diagnosis of sleep apnea. *Home sleep testing was ordered for reevaluation. * He was provided information on sleep apnea including corresponding risk factors and the importance of proper treatment. * Treatment options if indicated were reviewed today. Instructions:  o The patient understands that untreated or undertreated sleep apnea could impair judgement and the ability to function normally during the day.  o Call or return if symptoms worsen or persist.          Francisco Erickson MD, [facility]  Electronically signed 02/26/19       This note was created using voice recognition software. Despite editing, there may be syntax errors. This note will not be viewable in [address]

## 2019-02-27 ENCOUNTER — DOCUMENTATION ONLY (OUTPATIENT)
Dept: SLEEP MEDICINE | Age: 79
End: 2019-02-27

## 2019-03-07 LAB
ALBUMIN SERPL-MCNC: 4.5 G/DL (ref 3.5–4.8)
ALBUMIN/CREAT UR: 31.2 MG/G CREAT (ref 0–30)
ALBUMIN/GLOB SERPL: 1.7 {RATIO} (ref 1.2–2.2)
ALP SERPL-CCNC: 59 IU/L (ref 39–117)
ALT SERPL-CCNC: 19 IU/L (ref 0–44)
AST SERPL-CCNC: 22 IU/L (ref 0–40)
BILIRUB SERPL-MCNC: 0.5 MG/DL (ref 0–1.2)
BUN SERPL-MCNC: 16 MG/DL (ref 8–27)
BUN/CREAT SERPL: 13 (ref 10–24)
CALCIUM SERPL-MCNC: 9.8 MG/DL (ref 8.6–10.2)
CHLORIDE SERPL-SCNC: 103 MMOL/L (ref 96–106)
CHOLEST SERPL-MCNC: 128 MG/DL (ref 100–199)
CO2 SERPL-SCNC: 23 MMOL/L (ref 20–29)
CREAT SERPL-MCNC: 1.19 MG/DL (ref 0.76–1.27)
CREAT UR-MCNC: 119.5 MG/DL
EST. AVERAGE GLUCOSE BLD GHB EST-MCNC: 140 MG/DL
GLOBULIN SER CALC-MCNC: 2.7 G/DL (ref 1.5–4.5)
GLUCOSE SERPL-MCNC: 95 MG/DL (ref 65–99)
HBA1C MFR BLD: 6.5 % (ref 4.8–5.6)
HDLC SERPL-MCNC: 37 MG/DL
INTERPRETATION, 910389: NORMAL
INTERPRETATION: NORMAL
LDLC SERPL CALC-MCNC: 56 MG/DL (ref 0–99)
Lab: NORMAL
MICROALBUMIN UR-MCNC: 37.3 UG/ML
PDF IMAGE, 910387: NORMAL
POTASSIUM SERPL-SCNC: 4.3 MMOL/L (ref 3.5–5.2)
PROT SERPL-MCNC: 7.2 G/DL (ref 6–8.5)
SODIUM SERPL-SCNC: 143 MMOL/L (ref 134–144)
TRIGL SERPL-MCNC: 176 MG/DL (ref 0–149)
VLDLC SERPL CALC-MCNC: 35 MG/DL (ref 5–40)

## 2019-03-13 ENCOUNTER — OFFICE VISIT (OUTPATIENT)
Dept: ENDOCRINOLOGY | Age: 79
End: 2019-03-13

## 2019-03-13 VITALS
HEART RATE: 81 BPM | BODY MASS INDEX: 26.88 KG/M2 | DIASTOLIC BLOOD PRESSURE: 74 MMHG | WEIGHT: 187.8 LBS | SYSTOLIC BLOOD PRESSURE: 124 MMHG | HEIGHT: 70 IN

## 2019-03-13 DIAGNOSIS — R80.9 TYPE 2 DIABETES MELLITUS WITH MICROALBUMINURIA, WITH LONG-TERM CURRENT USE OF INSULIN (HCC): Primary | ICD-10-CM

## 2019-03-13 DIAGNOSIS — Z79.4 TYPE 2 DIABETES MELLITUS WITH MICROALBUMINURIA, WITH LONG-TERM CURRENT USE OF INSULIN (HCC): Primary | ICD-10-CM

## 2019-03-13 DIAGNOSIS — I10 ESSENTIAL HYPERTENSION: ICD-10-CM

## 2019-03-13 DIAGNOSIS — E78.5 DYSLIPIDEMIA: ICD-10-CM

## 2019-03-13 DIAGNOSIS — E11.29 TYPE 2 DIABETES MELLITUS WITH MICROALBUMINURIA, WITH LONG-TERM CURRENT USE OF INSULIN (HCC): Primary | ICD-10-CM

## 2019-03-13 NOTE — PATIENT INSTRUCTIONS
Diabetes:  Continue efforts with weight loss. Gradually increase exercise. Continue Invokana 100 mg  Continue metformin - Take 1000 mg twice daily  Continue Ozempic 0.5 mg weekly. Can increase to 1.0 mg weekly in future if desired  Continue Lantus - -Lower dose to 32 units daily. ** Lower dose should you have values < 90. Would lower dose in 4 unit increments weekly    Goals for blood sugars:  ** focus on checking bedtime and fasting sugars  Fasting  (less than 150)  Other times -  (less than 180).     Cholesterol   continue rosuvastatin - 5 mg    Blood pressure:  Losartan 50 mg - at bedtime

## 2019-03-18 NOTE — TELEPHONE ENCOUNTER
----- Message from Brittany Corrales sent at 3/18/2019  4:31 PM EDT -----  Regarding: Dr Calle/rx  Pt (p) [phone], pt f/u on his  Several phone rx refill request for his [facility] pharmacy, would like to know the status and if it will go out today,he has  Enough for one more shot in the morning, please give him a update before the end of the day

## 2019-03-19 RX ORDER — INSULIN GLARGINE 100 [IU]/ML
INJECTION, SOLUTION SUBCUTANEOUS
Qty: 40 ML | Refills: 3 | Status: SHIPPED | OUTPATIENT
Start: 2019-03-19 | End: 2020-01-15 | Stop reason: SDUPTHER

## 2019-04-01 RX ORDER — FAMOTIDINE 40 MG/1
40 TABLET, FILM COATED ORAL 2 TIMES DAILY
Qty: 180 TAB | Refills: 3 | Status: SHIPPED | OUTPATIENT
Start: 2019-04-01 | End: 2020-03-26 | Stop reason: SDUPTHER

## 2019-05-19 RX ORDER — CANAGLIFLOZIN 100 MG/1
TABLET, FILM COATED ORAL
Qty: 90 TAB | Refills: 3 | Status: SHIPPED | OUTPATIENT
Start: 2019-05-19 | End: 2019-09-11 | Stop reason: SDUPTHER

## 2019-06-10 ENCOUNTER — HOSPITAL ENCOUNTER (OUTPATIENT)
Dept: SLEEP MEDICINE | Age: 79
Discharge: HOME | End: 2019-06-10
Payer: MEDICARE

## 2019-06-10 VITALS
HEART RATE: 78 BPM | WEIGHT: 190.6 LBS | SYSTOLIC BLOOD PRESSURE: 135 MMHG | DIASTOLIC BLOOD PRESSURE: 88 MMHG | OXYGEN SATURATION: 98 % | BODY MASS INDEX: 28.23 KG/M2 | HEIGHT: 69 IN | TEMPERATURE: 97 F

## 2019-06-10 DIAGNOSIS — G47.33 OSA (OBSTRUCTIVE SLEEP APNEA): ICD-10-CM

## 2019-06-10 PROCEDURE — 95810 POLYSOM 6/> YRS 4/> PARAM: CPT | Performed by: SPECIALIST

## 2019-07-01 ENCOUNTER — DOCUMENTATION ONLY (OUTPATIENT)
Dept: SLEEP MEDICINE | Age: 79
End: 2019-07-01

## 2019-07-22 RX ORDER — CALCIUM CARB/VITAMIN D3/VIT K1 500-100-40
TABLET,CHEWABLE ORAL
Qty: 100 SYRINGE | Refills: 4 | Status: SHIPPED | OUTPATIENT
Start: 2019-07-22 | End: 2020-01-15 | Stop reason: SDUPTHER

## 2019-07-22 NOTE — TELEPHONE ENCOUNTER
Last appt: Visit date not found    Future Appointments   Date Time Provider George Zara   2019  8:50 AM Sadie Bobo MD RDE KELLY [address]       Requested Prescriptions     Pending Prescriptions Disp Refills    semaglutide (OZEMPIC) 0.25 mg/0.2 mL (2 mg/1.5 mL) sub-q pen 1 Box 11     Si.5 mg by SubCUTAneous route every seven (7) days.     Insulin Syringe-Needle U-100 (BD INSULIN SYRINGE ULTRA-FINE) 1 mL 31 gauge x 5/16 syrg 100 Syringe 4

## 2019-09-10 LAB
ALBUMIN SERPL-MCNC: 4.8 G/DL (ref 3.5–4.8)
ALBUMIN/CREAT UR: 27.1 MG/G CREAT (ref 0–30)
ALBUMIN/GLOB SERPL: 2.2 {RATIO} (ref 1.2–2.2)
ALP SERPL-CCNC: 60 IU/L (ref 39–117)
ALT SERPL-CCNC: 16 IU/L (ref 0–44)
AST SERPL-CCNC: 18 IU/L (ref 0–40)
BILIRUB SERPL-MCNC: 0.9 MG/DL (ref 0–1.2)
BUN SERPL-MCNC: 22 MG/DL (ref 8–27)
BUN/CREAT SERPL: 17 (ref 10–24)
CALCIUM SERPL-MCNC: 9.8 MG/DL (ref 8.6–10.2)
CHLORIDE SERPL-SCNC: 102 MMOL/L (ref 96–106)
CHOLEST SERPL-MCNC: 119 MG/DL (ref 100–199)
CO2 SERPL-SCNC: 21 MMOL/L (ref 20–29)
CREAT SERPL-MCNC: 1.27 MG/DL (ref 0.76–1.27)
CREAT UR-MCNC: 117.3 MG/DL
EST. AVERAGE GLUCOSE BLD GHB EST-MCNC: 128 MG/DL
GLOBULIN SER CALC-MCNC: 2.2 G/DL (ref 1.5–4.5)
GLUCOSE SERPL-MCNC: 112 MG/DL (ref 65–99)
HBA1C MFR BLD: 6.1 % (ref 4.8–5.6)
HDLC SERPL-MCNC: 33 MG/DL
INTERPRETATION, 910389: NORMAL
INTERPRETATION: NORMAL
LDLC SERPL CALC-MCNC: 59 MG/DL (ref 0–99)
Lab: NORMAL
MICROALBUMIN UR-MCNC: 31.8 UG/ML
PDF IMAGE, 910387: NORMAL
POTASSIUM SERPL-SCNC: 4.5 MMOL/L (ref 3.5–5.2)
PROT SERPL-MCNC: 7 G/DL (ref 6–8.5)
SODIUM SERPL-SCNC: 141 MMOL/L (ref 134–144)
TRIGL SERPL-MCNC: 134 MG/DL (ref 0–149)
VLDLC SERPL CALC-MCNC: 27 MG/DL (ref 5–40)

## 2019-09-11 ENCOUNTER — OFFICE VISIT (OUTPATIENT)
Dept: ENDOCRINOLOGY | Age: 79
End: 2019-09-11

## 2019-09-11 VITALS
SYSTOLIC BLOOD PRESSURE: 121 MMHG | WEIGHT: 181 LBS | HEIGHT: 68 IN | HEART RATE: 79 BPM | BODY MASS INDEX: 27.43 KG/M2 | DIASTOLIC BLOOD PRESSURE: 71 MMHG

## 2019-09-11 DIAGNOSIS — E11.8 TYPE 2 DIABETES MELLITUS WITH COMPLICATION, WITH LONG-TERM CURRENT USE OF INSULIN (HCC): Primary | ICD-10-CM

## 2019-09-11 DIAGNOSIS — I10 ESSENTIAL HYPERTENSION: ICD-10-CM

## 2019-09-11 DIAGNOSIS — K58.2 MIXED IRRITABLE BOWEL SYNDROME: ICD-10-CM

## 2019-09-11 DIAGNOSIS — Z79.4 TYPE 2 DIABETES MELLITUS WITH COMPLICATION, WITH LONG-TERM CURRENT USE OF INSULIN (HCC): Primary | ICD-10-CM

## 2019-09-11 DIAGNOSIS — E11.21 TYPE 2 DIABETES WITH NEPHROPATHY (HCC): ICD-10-CM

## 2019-09-11 DIAGNOSIS — E78.5 DYSLIPIDEMIA: ICD-10-CM

## 2019-09-11 RX ORDER — HYDROXYZINE 25 MG/1
TABLET, FILM COATED ORAL
Refills: 0 | COMMUNITY
Start: 2019-08-12 | End: 2021-05-06

## 2019-09-11 RX ORDER — OMEPRAZOLE 40 MG/1
CAPSULE, DELAYED RELEASE ORAL
Refills: 2 | COMMUNITY
Start: 2019-08-27 | End: 2020-01-15

## 2019-09-11 NOTE — PROGRESS NOTES
History of Present Illness: Carina Monroy is a 70 y.o. male presents for follow-up of diabetes. He also has HTN and dyslipidemia and JOSEPH. He has had diabetes since 2008. A1c 6.1 with pre-labs    Current diabetes regimen:  Metformin 1 g BID -having some frequent bowel movements and even one episode of borderline incontinence  Lantus: 32 units  Ozempic 0.5 mg weekly. Invokana 100 mg daily    Glucoses:  Brings log. Glucoses are typically in the 90s to low 120s. Mostly monitoring fasting. When he monitors at other times, such as before dinner at bedtime, glucoses are similar. Overall glucoses are very stable overnight when he monitors at bedtime in the morning    Diet:   Continues to work on dietary efforts. Smaller portions. Exercise - walking more. Using CPAP. BMI 27: Weight is down about another 10 lbs    Lipids:  Rosuvastatin 10 mg daily-taking one half tablet daily. Triglycerides improving with weight loss    Blood pressure:  Losartan 50 mg - taking at bedtime. 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 . Past Medical History:   Diagnosis Date    Cancer ([facility] [address]     melanoma shoulder right,basal cell cancer nose    Diabetes (HCC)     GERD (gastroesophageal reflux disease)     HTN (hypertension)     Hyperlipemia     Ill-defined condition     insomnia    Sleep apnea     cpap     Current Outpatient Medications   Medication Sig    omeprazole (PRILOSEC) 40 mg capsule TAKE 1 CAPSULE BY MOUTH EVERY DAY FOR ACID REFLUX    hydrOXYzine HCl (ATARAX) 25 mg tablet TAKE 1 TABLET BY MOUTH EVERY 8 HOURS AS NEEDED    semaglutide (OZEMPIC) 0.25 mg/0.2 mL (2 mg/1.5 mL) sub-q pen 0.5 mg by SubCUTAneous route every seven (7) days.     Insulin Syringe-Needle U-100 (BD INSULIN SYRINGE ULTRA-FINE) 1 mL 31 gauge x 5/16 syrg USING ONCE DAILY WITH LANTUS    INVOKANA 100 mg tablet TAKE 1 TABLET BY MOUTH DAILY (BEFORE BREAKFAST).  famotidine (PEPCID) 40 mg tablet TAKE 1 TAB BY MOUTH TWO (2) TIMES A DAY.  glucose blood VI test strips (ONETOUCH ULTRA BLUE TEST STRIP) strip USE AS DIRECTED TWICE A DAY E11.9    insulin glargine (LANTUS U-100 INSULIN) 100 unit/mL injection INJECT 36 UNITS SUBCUTANEOUSLY EVERY NIGHT    metFORMIN (GLUCOPHAGE) 1,000 mg tablet TAKE 1 TABLET BY MOUTH TWICE A DAY WITH FOOD    losartan (COZAAR) 50 mg tablet TAKE 1 TAB BY MOUTH NIGHTLY.  rosuvastatin (CRESTOR) 5 mg tablet Take 1 Tab by mouth daily.  Lancing Device misc Monitor blood sugar twice daily. Diagnosis code: E11.8    Blood-Glucose Meter (ONETOUCH ULTRA2) monitoring kit Monitor blood sugar twice daily. Diagnosis code: E11.9    lancets (ONE TOUCH DELICA) 33 gauge misc Monitor blood sugar twice daily. Diagnosis code: E11.9    acetaminophen (TYLENOL) 325 mg tablet Take  by mouth every four (4) hours as needed for Pain.  cyanocobalamin (VITAMIN B-12) 1,000 mcg tablet Take 1,000 mcg by mouth daily.  MELATONIN, BULK, Take 3 mg by mouth nightly as needed.  Insulin Needles, Disposable, (TITI PEN NEEDLE) 32 x 5/32 \" Ndle Twice daily     No current facility-administered medications for this visit.       No Known Allergies    Review of Systems:  - Eyes: no blurry vision or double vision  - Cardiovascular: no chest pain  - Respiratory: no shortness of breath  - Musculoskeletal: no myalgias  - Neurological: no numbness/tingling in extremities    Physical Examination:  Visit Vitals  /71 (BP 1 Location: Left arm, BP Patient Position: Sitting)   Pulse 79   Ht 5' 8\" (1.727 m)   Wt 181 lb (82.1 kg)   BMI 27.52 kg/m²   -   - General: pleasant, no distress, normal gait   HEENT: hearing intact, EOMI, clear sclera without icterus  - Cardiovascular: regular, normal rate   - Respiratory: normal effort  - Integumentary: no edema  - Psychiatric: normal mood and affect    Data Reviewed:   Lab Results   Component Value Date/Time Hemoglobin A1c 6.1 09/09/2019 10:35 AM      Lab Results   Component Value Date/Time    Sodium 141 09/09/2019 10:35 AM    Potassium 4.5 09/09/2019 10:35 AM    Creatinine 1.27 09/09/2019 10:35 AM    Microalb/Creat ratio (ug/mg creat.) 27.1 09/09/2019 10:35 AM        Lab Results   Component Value Date/Time    Cholesterol, total 119 09/09/2019 10:35 AM    HDL Cholesterol 33 09/09/2019 10:35 AM    LDL, calculated 59 09/09/2019 10:35 AM    Triglyceride 134 09/09/2019 10:35 AM      No results found for: TSH, FT4, TRALT, TMCLT, TSHEXT     Assessment/Plan:   1. Type 2 diabetes mellitus with complication, with long-term current use of insulin (HCC)   Overall control is very good. Hemoglobin A1c is lower than desired/needed on someone who is still taking insulin  Lower Lantus dose to 25 units and advised him to lower dose further in 5 unit increments for values less than 100  Continue Ozempic  He is having some gastrointestinal side effects, which may be related to metformin. Change metformin to extended release and combine with Invokana as Invokamet /1000-advised 1 to 2 tablets daily as tolerated  Continue Ozempic   2. BMI 27.0-27.9,adult   Lower insulin dosages to help with weight loss. Encouraged diet and exercise efforts   3. Dyslipidemia   Continue rosuvastatin efforts with weight loss   4. Type 2 diabetes with nephropathy ([address]    5. Mixed irritable bowel syndrome    See above. Change metformin to extended release  Check B12 levels   6. History of hypertension: Discontinue losartan. This appears to not be needed after weight loss and with SGL T2 inhibitor use  Patient Instructions   Diabetes:  Continue efforts with weight loss. Increase exercise    Combine Inovkana + metformin - > Invokamet /1000 twice daily. Hopefully this will be better tolerated. Continue Ozempic 0.5 mg weekly.  Can increase to 1.0 mg weekly in future if desired  Continue Lantus - lower does to 25 units  ** Lower dose by 5 unit increments further for values < 90    Take b12 twice weekly. Goals for blood sugars:  ** focus on checking bedtime and fasting sugars  Fasting  (less than 150)  Other times -  (less than 180). Cholesterol   continue rosuvastatin - 5 mg    Blood pressure:  Stop losartan. We will follow        Follow-up and Dispositions    · Return in about 4 months (around 1/11/2020).

## 2019-09-12 RX ORDER — ROSUVASTATIN CALCIUM 5 MG/1
5 TABLET, COATED ORAL DAILY
Qty: 90 TAB | Refills: 3 | Status: SHIPPED | OUTPATIENT
Start: 2019-09-12 | End: 2020-01-15 | Stop reason: SDUPTHER

## 2019-09-12 NOTE — TELEPHONE ENCOUNTER
Pt called to get a refills on his Rx Rosuvastatin . Pt stated, the pharmacy already faxed the request to the office and waiting on an approval from the physician.

## 2019-09-22 RX ORDER — LOSARTAN POTASSIUM 50 MG/1
TABLET ORAL
Qty: 90 TAB | Refills: 3 | Status: SHIPPED | OUTPATIENT
Start: 2019-09-22 | End: 2020-01-15

## 2020-01-08 LAB
ALBUMIN SERPL-MCNC: 4.3 G/DL (ref 3.5–4.8)
ALBUMIN/CREAT UR: 43.2 MG/G CREAT (ref 0–30)
ALBUMIN/GLOB SERPL: 1.6 {RATIO} (ref 1.2–2.2)
ALP SERPL-CCNC: 65 IU/L (ref 39–117)
ALT SERPL-CCNC: 20 IU/L (ref 0–44)
AST SERPL-CCNC: 21 IU/L (ref 0–40)
BILIRUB SERPL-MCNC: 0.3 MG/DL (ref 0–1.2)
BUN SERPL-MCNC: 16 MG/DL (ref 8–27)
BUN/CREAT SERPL: 13 (ref 10–24)
CALCIUM SERPL-MCNC: 10 MG/DL (ref 8.6–10.2)
CHLORIDE SERPL-SCNC: 100 MMOL/L (ref 96–106)
CHOLEST SERPL-MCNC: 147 MG/DL (ref 100–199)
CO2 SERPL-SCNC: 23 MMOL/L (ref 20–29)
CREAT SERPL-MCNC: 1.24 MG/DL (ref 0.76–1.27)
CREAT UR-MCNC: 49.5 MG/DL
EST. AVERAGE GLUCOSE BLD GHB EST-MCNC: 148 MG/DL
GLOBULIN SER CALC-MCNC: 2.7 G/DL (ref 1.5–4.5)
GLUCOSE SERPL-MCNC: 126 MG/DL (ref 65–99)
HBA1C MFR BLD: 6.8 % (ref 4.8–5.6)
HDLC SERPL-MCNC: 32 MG/DL
INTERPRETATION, 910389: NORMAL
INTERPRETATION: NORMAL
LDLC SERPL CALC-MCNC: 55 MG/DL (ref 0–99)
Lab: NORMAL
MICROALBUMIN UR-MCNC: 21.4 UG/ML
PDF IMAGE, 910387: NORMAL
POTASSIUM SERPL-SCNC: 4.3 MMOL/L (ref 3.5–5.2)
PROT SERPL-MCNC: 7 G/DL (ref 6–8.5)
SODIUM SERPL-SCNC: 139 MMOL/L (ref 134–144)
TRIGL SERPL-MCNC: 301 MG/DL (ref 0–149)
VIT B12 SERPL-MCNC: 518 PG/ML (ref 232–1245)
VLDLC SERPL CALC-MCNC: 60 MG/DL (ref 5–40)

## 2020-01-15 ENCOUNTER — OFFICE VISIT (OUTPATIENT)
Dept: ENDOCRINOLOGY | Age: 80
End: 2020-01-15

## 2020-01-15 VITALS
HEART RATE: 81 BPM | DIASTOLIC BLOOD PRESSURE: 80 MMHG | HEIGHT: 68 IN | WEIGHT: 185 LBS | BODY MASS INDEX: 28.04 KG/M2 | SYSTOLIC BLOOD PRESSURE: 131 MMHG

## 2020-01-15 DIAGNOSIS — E11.29 TYPE 2 DIABETES MELLITUS WITH MICROALBUMINURIA, WITH LONG-TERM CURRENT USE OF INSULIN (HCC): Primary | ICD-10-CM

## 2020-01-15 DIAGNOSIS — E78.5 DYSLIPIDEMIA: ICD-10-CM

## 2020-01-15 DIAGNOSIS — I10 ESSENTIAL HYPERTENSION: ICD-10-CM

## 2020-01-15 DIAGNOSIS — R80.9 TYPE 2 DIABETES MELLITUS WITH MICROALBUMINURIA, WITH LONG-TERM CURRENT USE OF INSULIN (HCC): Primary | ICD-10-CM

## 2020-01-15 DIAGNOSIS — Z79.4 TYPE 2 DIABETES MELLITUS WITH MICROALBUMINURIA, WITH LONG-TERM CURRENT USE OF INSULIN (HCC): Primary | ICD-10-CM

## 2020-01-15 RX ORDER — CALCIUM CARB/VITAMIN D3/VIT K1 500-100-40
TABLET,CHEWABLE ORAL
Qty: 100 SYRINGE | Refills: 4 | Status: SHIPPED | OUTPATIENT
Start: 2020-01-15

## 2020-01-15 RX ORDER — INSULIN GLARGINE 100 [IU]/ML
INJECTION, SOLUTION SUBCUTANEOUS
Qty: 30 ML | Refills: 3 | Status: SHIPPED | OUTPATIENT
Start: 2020-01-15 | End: 2020-04-08

## 2020-01-15 RX ORDER — ROSUVASTATIN CALCIUM 5 MG/1
5 TABLET, COATED ORAL DAILY
Qty: 90 TAB | Refills: 3 | Status: SHIPPED | OUTPATIENT
Start: 2020-01-15 | End: 2021-03-03 | Stop reason: SDUPTHER

## 2020-01-15 NOTE — PATIENT INSTRUCTIONS
Diabetes: 
Continue efforts with diet weight loss. Increase exercise Invokamet /1000 twice daily. Continue Ozempic 0.5 mg weekly. - with 1.0 mg dosing pen, dial to 38 clicks to get 0.5 mg 
Continue Lantus - 26 units ** Lower dose by 2-3 unit increments further for values < 90 
 
 
Goals for blood sugars: 
** focus on checking bedtime and fasting sugars Fasting  (less than 150) Other times -  (less than 180). Cholesterol  
continue rosuvastatin - 5 mg Blood pressure: 
- off losartan. Microalbumin/creatinine increased some Recommend monitoring at home -  
Omron series 5 - upper arm Sit for 5 minutes. Take several readings Goal is < 120/70. Aura  is < 130/80. If often higher than this, should resume losartan at night.

## 2020-01-15 NOTE — PROGRESS NOTES
History of Present Illness: Mekhi Leger is a 70 y.o. male presents for follow-up of diabetes. He also has HTN and dyslipidemia and JOSEPH. He has had diabetes since 2008. Since last visit he had problems with itching. Uncertain cause. Taking Alegra and Hydroxyzine.      Current diabetes regimen:  Lantus: 32 units  Ozempic 0.5 mg weekly. Invokamet /1000      Glucoses:  Brings log - checking mostly fasting - values were in 100s-110s in October  Values higher - 120s-140 more often around holidays  Bedtime values checked infrequently, but range from 110 to 228. Most are < 180. Diet:   Continues to work on dietary efforts. Smaller portions.      Exercise - walking more.      Using CPAP. BMI 27:      Lipids:  Rosuvastatin 10 mg daily-taking one half tablet daily. Triglycerides improving with weight loss     Blood pressure:  Losartan 50 mg - taking at bedtime. 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 . Past Medical History:   Diagnosis Date    Cancer ([facility] [address]     melanoma shoulder right,basal cell cancer nose    Diabetes ([facility] [address]     GERD (gastroesophageal reflux disease)     HTN (hypertension)     Hyperlipemia     Ill-defined condition     insomnia    Sleep apnea     cpap     Current Outpatient Medications   Medication Sig    cholecalciferol, vitamin D3, (VITAMIN D3 PO) Take 1 Tab by mouth daily.  losartan (COZAAR) 50 mg tablet TAKE 1 TAB BY MOUTH NIGHTLY.  rosuvastatin (CRESTOR) 5 mg tablet Take 1 Tab by mouth daily.  hydrOXYzine HCl (ATARAX) 25 mg tablet TAKE 1 TABLET BY MOUTH EVERY 8 HOURS AS NEEDED    canagliflozin-metformin (INVOKAMET XR) 150-1,000 mg TBph Take 2 tablets/day, as directed.  semaglutide (OZEMPIC) 0.25 mg/0.2 mL (2 mg/1.5 mL) sub-q pen 0.5 mg by SubCUTAneous route every seven (7) days.     Insulin Syringe-Needle U-100 (BD INSULIN SYRINGE ULTRA-FINE) 1 mL 31 gauge x 5/16 syrg USING ONCE DAILY WITH LANTUS    famotidine (PEPCID) 40 mg tablet TAKE 1 TAB BY MOUTH TWO (2) TIMES A DAY.  glucose blood VI test strips (ONETOUCH ULTRA BLUE TEST STRIP) strip USE AS DIRECTED TWICE A DAY E11.9    insulin glargine (LANTUS U-100 INSULIN) 100 unit/mL injection INJECT 36 UNITS SUBCUTANEOUSLY EVERY NIGHT (Patient taking differently: INJECT 26 UNITS SUBCUTANEOUSLY EVERY NIGHT)    Lancing Device misc Monitor blood sugar twice daily. Diagnosis code: E11.8    lancets (ONE TOUCH DELICA) 33 gauge misc Monitor blood sugar twice daily. Diagnosis code: E11.9    Insulin Needles, Disposable, (TITI PEN NEEDLE) 32 x 5/32 \" Ndle Twice daily    omeprazole (PRILOSEC) 40 mg capsule TAKE 1 CAPSULE BY MOUTH EVERY DAY FOR ACID REFLUX    Blood-Glucose Meter (ONETOUCH ULTRA2) monitoring kit Monitor blood sugar twice daily. Diagnosis code: E11.9    acetaminophen (TYLENOL) 325 mg tablet Take  by mouth every four (4) hours as needed for Pain.  cyanocobalamin (VITAMIN B-12) 1,000 mcg tablet Take 1,000 mcg by mouth daily.  MELATONIN, BULK, Take 3 mg by mouth nightly as needed. No current facility-administered medications for this visit.       No Known Allergies    Review of Systems:  - Eyes: no blurry vision or double vision  - Cardiovascular: no chest pain  - Respiratory: no shortness of breath  - Musculoskeletal: no myalgias  - Neurological: no numbness/tingling in extremities    Physical Examination:  Visit Vitals  /80 (BP 1 Location: Left arm, BP Patient Position: Sitting)   Pulse 81   Ht 5' 8\" (1.727 m)   Wt 185 lb (83.9 kg)   BMI 28.13 kg/m²   -   - General: pleasant, no distress, normal gait   HEENT: hearing intact, EOMI, clear sclera without icterus  - Cardiovascular: regular, normal rate   - Respiratory: normal effort  - Integumentary: no edema   Diabetic foot exam:        Right Foot:   Visual Exam: normal    Pulse DP: 2+ (normal)   Filament test: normal sensation      - Psychiatric: normal mood and affect    Data Reviewed:   Lab Results   Component Value Date/Time    Hemoglobin A1c 6.8 01/07/2020 08:31 AM      Lab Results   Component Value Date/Time    Sodium 139 01/07/2020 08:31 AM    Potassium 4.3 01/07/2020 08:31 AM    Creatinine 1.24 01/07/2020 08:31 AM    Microalb/Creat ratio (ug/mg creat.) 43.2 01/07/2020 08:31 AM        Lab Results   Component Value Date/Time    Cholesterol, total 147 01/07/2020 08:31 AM    HDL Cholesterol 32 01/07/2020 08:31 AM    LDL, calculated 55 01/07/2020 08:31 AM    Triglyceride 301 01/07/2020 08:31 AM      No results found for: TSH, FT4, TRALT, TMCLT, TSHEXT     Assessment/Plan:   1. Type 2 diabetes mellitus with microalbuminuria, with long-term current use of insulin (HCC)   - continue Invokamet XR + Ozempic  - Lantus - continue 26 units. Can lower dose incrementally for values < 90    Microalbumin - increased after stopping losartan. May need to resume. Follow microalbumin every 3-6 months for now. 2. Dyslipidemia   - continue rosuvastatin  - encouraged exercise and weight loss. 3. Essential hypertension   - stopped losartan after last visit. - recommend he monitor at home   4. BMI 28.0-28.9,adult   - encouraged smaller portions and increased exercise. If he were to lose weight, he would need much less medications     Patient Instructions   Diabetes:  Continue efforts with diet weight loss. Increase exercise    Invokamet /1000 twice daily. Continue Ozempic 0.5 mg weekly. - with 1.0 mg dosing pen, dial to 38 clicks to get 0.5 mg  Continue Lantus - 26 units  ** Lower dose by 2-3 unit increments further for values < 90      Goals for blood sugars:  ** focus on checking bedtime and fasting sugars  Fasting  (less than 150)  Other times -  (less than 180). Cholesterol   continue rosuvastatin - 5 mg    Blood pressure:  - off losartan.   Microalbumin/creatinine increased some     Recommend monitoring at home -   Omron series 5 - upper arm  Sit for 5 minutes. Take several readings  Goal is < 120/70. [id number] is < 130/80. If often higher than this, should resume losartan at night.

## 2020-03-26 RX ORDER — FAMOTIDINE 40 MG/1
40 TABLET, FILM COATED ORAL 2 TIMES DAILY
Qty: 180 TAB | Refills: 3 | Status: SHIPPED | OUTPATIENT
Start: 2020-03-26 | End: 2021-03-26

## 2020-04-08 RX ORDER — INSULIN GLARGINE 100 [IU]/ML
INJECTION, SOLUTION SUBCUTANEOUS
Qty: 4 VIAL | Refills: 3 | Status: SHIPPED | OUTPATIENT
Start: 2020-04-08 | End: 2020-11-05

## 2020-09-07 NOTE — PROGRESS NOTES
History of Present Illness: Skyla Eason is a 78 y.o. male presents for follow-up of diabetes  He also has HTN and dyslipidemia and JOSEPH. He has had diabetes since 2008. A1c 6.6 with pre-labs    Current diabetes regimen:  Metformin 1 g BID   Lantus: 36 units  Bydureon weekly - started 9/2013  Invokana 100 mg daily    Glucoses:  Mostly checks fasting -  with most < 130. Few values over 150 . He denies any low glucoses. Diet:   Eating smaller portions. Exercise - 'not what it should be', but a little better than what it was. Feels he is sleeping better. Using CPAP. Feels this is still not leading to restfull sleep     Wt - has maintained wt loss. Lipids:  Rosuvastatin 10 mg daily-taking one half tablet daily. Blood pressure:  Losartan 50 mg - taking at bedtime. 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 Past Medical History:   Diagnosis Date    Cancer ([facility] [address]     melanoma shoulder right,basal cell cancer nose    Diabetes ([facility] [address]     GERD (gastroesophageal reflux disease)     HTN (hypertension)     Hyperlipemia     Ill-defined condition     insomnia    Sleep apnea     cpap     Current Outpatient Prescriptions   Medication Sig    lancets (ONE TOUCH DELICA) 33 gauge misc Monitor blood sugar twice daily. Diagnosis code: E11.9    famotidine (PEPCID) 20 mg tablet TAKE 1 TAB BY MOUTH TWO (2) TIMES A DAY.  INVOKANA 100 mg tablet TAKE 1 TAB BY MOUTH DAILY (BEFORE BREAKFAST).  rosuvastatin (CRESTOR) 10 mg tablet TAKE 1 TAB BY MOUTH NIGHTLY.  LANTUS 100 unit/mL injection INJECT 36 UNITS SUBCUTANEOUSLY EVERY NIGHT    insulin glargine (LANTUS) 100 unit/mL injection INJECT 36 UNITS SUBCUTANEOUSLY EVERY NIGHT    ONETOUCH ULTRA TEST strip USE AS DIRECTED TWICE A DAY Dx: E11.9    Blood-Glucose Meter (ONETOUCH ULTRA2) monitoring kit Monitor blood sugar twice daily. Diagnosis code: E11.9    CELECOXIB (CELEBREX PO) Take 200 mg by mouth.  Every other day    acetaminophen (TYLENOL) 325 mg tablet Take  by mouth every four (4) hours as needed for Pain.  metFORMIN (GLUCOPHAGE) 1,000 mg tablet TAKE 1 TABLET BY MOUTH TWICE A DAY WITH FOOD    BYDUREON 2 mg/0.65 mL pnij INJECT 2MG EVERY 7 DAYS    losartan (COZAAR) 50 mg tablet Take 1 Tab by mouth two (2) times a day. (Patient taking differently: Take 50 mg by mouth daily.)    cyanocobalamin (VITAMIN B-12) 1,000 mcg tablet Take 1,000 mcg by mouth daily.  MELATONIN, BULK, Take 3 mg by mouth nightly as needed.  BD INSULIN SYRINGE ULTRA-FINE 1 mL 31 x 5/16\" syrg USING ONCE DAILY WITH LANTUS    Insulin Needles, Disposable, (TITI PEN NEEDLE) 32 x 5/32 \" Ndle Twice daily    aspirin 81 mg tablet Take 81 mg by mouth. No current facility-administered medications for this visit.       No Known Allergies    Review of Systems:  - Eyes: no blurry vision or double vision  - Cardiovascular: no chest pain  - Respiratory: no shortness of breath  - Musculoskeletal: no myalgias  - Neurological: no numbness/tingling in extremities    Physical Examination:  Visit Vitals    /76    Pulse 77    Ht 5' 10\" (1.778 m)    Wt 195 lb (88.5 kg)    BMI 27.98 kg/m2   -   - General: pleasant, no distress, normal gait   HEENT: hearing intact, EOMI, clear sclera without icterus  - Cardiovascular: regular, normal rate   - Respiratory: normal effort  - Integumentary: no edema   Diabetic foot exam:     Left: Filament test normal sensation with micro filament   Pulse DP: 2+ (normal)   Deformities: None  Right: Filament test normal sensation with micro filament   Pulse DP: 2+ (normal)   Deformities: None    - Psychiatric: normal mood and affect    Data Reviewed:     Component      Latest Ref Rng & Units 10/3/2017 10/3/2017 10/3/2017 6/15/2017           4:11 PM  4:11 PM  4:11 PM  7:42 AM   Glucose      65 - 99 mg/dL   141 (H)    BUN      8 - 27 mg/dL   21    Creatinine      0.76 - 1.27 mg/dL   1.19    GFR est non-AA      >59 mL/min/1.73   59 (L)    GFR est AA      >59 mL/min/1.73   69    BUN/Creatinine ratio      10 - 24   18    Sodium      134 - 144 mmol/L   142    Potassium      3.5 - 5.2 mmol/L   4.5    Chloride      96 - 106 mmol/L   102    CO2      18 - 29 mmol/L   22    Calcium      8.6 - 10.2 mg/dL   9.6    Protein, total      6.0 - 8.5 g/dL   6.9    Albumin      3.5 - 4.8 g/dL   4.4    GLOBULIN, TOTAL      1.5 - 4.5 g/dL   2.5    A-G Ratio      1.2 - 2.2   1.8    Bilirubin, total      0.0 - 1.2 mg/dL   0.5    Alk. phosphatase      39 - 117 IU/L   56    AST      0 - 40 IU/L   21    ALT (SGPT)      0 - 44 IU/L   19    Cholesterol, total      100 - 199 mg/dL  126     Triglyceride      0 - 149 mg/dL  217 (H)     HDL Cholesterol      >39 mg/dL  34 (L)     VLDL, calculated      5 - 40 mg/dL  43 (H)     LDL, calculated      0 - 99 mg/dL  49     Creatinine, urine      Not Estab. mg/dL    131.7   Microalbumin, urine      Not Estab. ug/mL    40.8   Microalbumin/Creat. Ratio      0.0 - 30.0 mg/g creat    31.0 (H)   Hemoglobin A1c, (calculated)      4.8 - 5.6 % 6.6 (H)      Estimated average glucose      mg/dL 143        Assessment/Plan:   1. Type 2 diabetes mellitus with complication, with long-term current use of insulin (HCC)   Controlled. - lower Lantus dose to 32 units and advised him to decrease further should he have values < 100 often.  -continue metformin, Bydureon and Invokana  - encouraged him to walk more   2. Essential hypertension   - continue losartan at bedtime. Controlled. 3. Dyslipidemia   - continue rosuvastatin and weight loss efforts   4. BMI 27.0-27.9,adult   - he has had success with weight loss. Encouraged continued efforts with diet and encouraged increase in exercise  - diabetes, cholesterol and JOSEPH would improve with weight loss and I suspect energy, mood and sleep would improve as well. Patient Instructions   Diabetes:  Continue efforts with weight loss.  Increase exercise    Continue Bydureon  Continue Invokana 100 mg  Continue metformin - Take 1000 mg twice daily  Continue Lantus - - Decrease to 32 units daily and reassess in 1 week. ** Lower Lantus dose if you have values less than 100 often. Goals for blood sugars:  ** focus on checking bedtime and fasting sugars  Fasting  (less than 150)  Other times -  (less than 180). Cholesterol   continue rosuvastatin - 10 mg - continue taking 1/2 tablet    Blood pressure:  Losartan 50 mg - at bedtime        Follow-up Disposition:  Return in about 4 months (around 2/11/2018).     Copy sent to: oriented to person, place and time

## 2020-09-10 ENCOUNTER — TELEPHONE (OUTPATIENT)
Dept: ENDOCRINOLOGY | Age: 80
End: 2020-09-10

## 2020-09-11 NOTE — TELEPHONE ENCOUNTER
Please call him to let him know I received a fax from [employer] stating the famotidine 40 mg tabs are on backorder. Given this is an acid reflux medication and I won't be treating him for this, please have him contact his PCP or if he has a GI doctor he sees, have him contact one of them for an alternative. Thanks.

## 2020-10-28 ENCOUNTER — TELEPHONE (OUTPATIENT)
Dept: ENDOCRINOLOGY | Age: 80
End: 2020-10-28

## 2020-10-28 DIAGNOSIS — E11.29 TYPE 2 DIABETES MELLITUS WITH MICROALBUMINURIA, WITH LONG-TERM CURRENT USE OF INSULIN (HCC): Primary | ICD-10-CM

## 2020-10-28 DIAGNOSIS — R80.9 TYPE 2 DIABETES MELLITUS WITH MICROALBUMINURIA, WITH LONG-TERM CURRENT USE OF INSULIN (HCC): Primary | ICD-10-CM

## 2020-10-28 DIAGNOSIS — Z79.4 TYPE 2 DIABETES MELLITUS WITH MICROALBUMINURIA, WITH LONG-TERM CURRENT USE OF INSULIN (HCC): Primary | ICD-10-CM

## 2020-10-28 DIAGNOSIS — I10 ESSENTIAL HYPERTENSION: ICD-10-CM

## 2020-10-28 DIAGNOSIS — E78.5 DYSLIPIDEMIA: ICD-10-CM

## 2020-10-28 NOTE — TELEPHONE ENCOUNTER
----- Message from Noemi Haney sent at 10/28/2020 11:57 AM EDT -----  Regarding: Dr Tanesha Chan first and last name:      Reason for call:pt is a former pt of Dr Whitney Bliss, last sen on 1/5/2020, he is not sure if he has pending orders for his upcoming appt with Dr La Morgan on 11/5/2020  for a virtual visit, but will need orders , submitted electronically to [facility] so they can be ready for sometime this week. So he can have results for the virtual visit on 11/5/2020        Callback required yes/no and why:please call to confirm as soon as possible the orders were submitted.        Best contact number(s):[phone]      Details to clarify the request:      Noemi Haney

## 2020-10-29 NOTE — TELEPHONE ENCOUNTER
----- Message from Irma Colindres sent at 10/29/2020  3:26 PM EDT -----  Regarding: Dr Daniel Louise  Patient return call    Caller's first and last name and relationship (if not the patient):      Best contact number(s):[phone]      Whose call is being returned:Tressa Lamas to clarify the request:regarding his appt next week,      Irma Colindres

## 2020-10-29 NOTE — TELEPHONE ENCOUNTER
----- Message from Abdirahman Morejon sent at 10/29/2020  3:26 PM EDT -----  Regarding: Dr Kennedy Master  Patient return call    Caller's first and last name and relationship (if not the patient):      Best contact number(s):[phone]      Whose call is being returned:Tressa      Adams to clarify the request:regarding his appt next week,      Abdirahman Morejon

## 2020-10-29 NOTE — TELEPHONE ENCOUNTER
Please let him know:    I put an order directly into the [employer] system to repeat your labs now so just ask for the order under my name. Please fast for your labs. Don't eat anything after midnight. However, drink at least 6-8 oz of water the morning of the lab draw to avoid dehydration and to allow for the tech to find your vein easier. Be prepared to give a urine sample to test for any effect of the diabetes on your kidneys.

## 2020-10-31 LAB
ALBUMIN SERPL-MCNC: 4.2 G/DL (ref 3.7–4.7)
ALBUMIN/CREAT UR: 21 MG/G CREAT (ref 0–29)
ALBUMIN/GLOB SERPL: 1.6 {RATIO} (ref 1.2–2.2)
ALP SERPL-CCNC: 61 IU/L (ref 39–117)
ALT SERPL-CCNC: 19 IU/L (ref 0–44)
AST SERPL-CCNC: 20 IU/L (ref 0–40)
BILIRUB SERPL-MCNC: 0.3 MG/DL (ref 0–1.2)
BUN SERPL-MCNC: 18 MG/DL (ref 8–27)
BUN/CREAT SERPL: 14 (ref 10–24)
CALCIUM SERPL-MCNC: 9.5 MG/DL (ref 8.6–10.2)
CHLORIDE SERPL-SCNC: 104 MMOL/L (ref 96–106)
CHOLEST SERPL-MCNC: 132 MG/DL (ref 100–199)
CO2 SERPL-SCNC: 25 MMOL/L (ref 20–29)
CREAT SERPL-MCNC: 1.27 MG/DL (ref 0.76–1.27)
CREAT UR-MCNC: 89.2 MG/DL
EST. AVERAGE GLUCOSE BLD GHB EST-MCNC: 160 MG/DL
GLOBULIN SER CALC-MCNC: 2.7 G/DL (ref 1.5–4.5)
GLUCOSE SERPL-MCNC: 128 MG/DL (ref 65–99)
HBA1C MFR BLD: 7.2 % (ref 4.8–5.6)
HDLC SERPL-MCNC: 35 MG/DL
INTERPRETATION, 910389: NORMAL
INTERPRETATION: NORMAL
LDLC SERPL CALC-MCNC: 74 MG/DL (ref 0–99)
Lab: NORMAL
MICROALBUMIN UR-MCNC: 18.9 UG/ML
PDF IMAGE, 910387: NORMAL
POTASSIUM SERPL-SCNC: 3.9 MMOL/L (ref 3.5–5.2)
PROT SERPL-MCNC: 6.9 G/DL (ref 6–8.5)
SODIUM SERPL-SCNC: 140 MMOL/L (ref 134–144)
TRIGL SERPL-MCNC: 129 MG/DL (ref 0–149)
VLDLC SERPL CALC-MCNC: 23 MG/DL (ref 5–40)

## 2020-11-05 ENCOUNTER — VIRTUAL VISIT (OUTPATIENT)
Dept: ENDOCRINOLOGY | Age: 80
End: 2020-11-05
Payer: MEDICARE

## 2020-11-05 DIAGNOSIS — R80.9 TYPE 2 DIABETES MELLITUS WITH MICROALBUMINURIA, WITH LONG-TERM CURRENT USE OF INSULIN (HCC): Primary | ICD-10-CM

## 2020-11-05 DIAGNOSIS — E78.5 DYSLIPIDEMIA: ICD-10-CM

## 2020-11-05 DIAGNOSIS — Z79.4 TYPE 2 DIABETES MELLITUS WITH MICROALBUMINURIA, WITH LONG-TERM CURRENT USE OF INSULIN (HCC): Primary | ICD-10-CM

## 2020-11-05 DIAGNOSIS — E11.29 TYPE 2 DIABETES MELLITUS WITH MICROALBUMINURIA, WITH LONG-TERM CURRENT USE OF INSULIN (HCC): Primary | ICD-10-CM

## 2020-11-05 DIAGNOSIS — I10 ESSENTIAL HYPERTENSION: ICD-10-CM

## 2020-11-05 PROCEDURE — 99214 OFFICE O/P EST MOD 30 MIN: CPT | Performed by: INTERNAL MEDICINE

## 2020-11-05 RX ORDER — MINERAL OIL
180 ENEMA (ML) RECTAL EVERY OTHER DAY
COMMUNITY
End: 2021-05-06

## 2020-11-05 RX ORDER — INSULIN GLARGINE 100 [IU]/ML
INJECTION, SOLUTION SUBCUTANEOUS
Qty: 4 VIAL | Refills: 3
Start: 2020-11-05 | End: 2021-04-12

## 2020-11-05 NOTE — PATIENT INSTRUCTIONS
1) Your Hemoglobin A1c (3 month test of blood sugar) is 7.2% which is at goal of 7.5% or less so I will keep your diabetes regimen the same. 2) BUN and creatinine are markers of kidney function. Your values are normal. 
 
3) ALT and AST are markers of liver function. Your values are normal. 
 
4) Your cholesterol is at goal. 
 
5) Your urine protein is normal off the losartan so it's fine to stay off this for now. 6) Please come for a follow up visit on 5/6/21 at 8:30am in our [place] office. 7) I put an order directly into the [facility] system to repeat your labs in the 1-2 weeks prior to your next visit so just ask for the order under my name and you will receive a courtesy reminder through [facility] [employer] to have these drawn.

## 2020-11-05 NOTE — PROGRESS NOTES
Chief Complaint   Patient presents with    Thyroid Problem     pcp and pharmacy confimed    Other     [phone] doxy/iphone       **THIS IS A VIRTUAL VISIT VIA A VIDEO SYNCHRONOUS DISCUSSION. PATIENT AGREED TO HAVE THEIR CARE DELIVERED OVER A DOXY. ME VIDEO VISIT IN PLACE OF THEIR REGULARLY SCHEDULED OFFICE VISIT**    History of Present Illness: Ramon Asencio is a 68 y.o. male here for follow up of diabetes. Has been compliant with ozempic 38 clicks every Friday. Has been compliant with invokamet 1 tab bid and the lantus 24 units in the morning. Fasting sugars are running in the 110-130s and has checked a few bedtime readings and saw 170-180. Has not had any readings under 99. Weight is currently 181 down from 185 at last visit with Dr. Phu Begum in 1/20. Compliant with crestor 5 mg daily. Has been off losartan. Current Outpatient Medications   Medication Sig    fexofenadine (ALLEGRA) 180 mg tablet Take 180 mg by mouth every other day.  insulin glargine (Lantus U-100 Insulin) 100 unit/mL injection INJECT 24 UNITS SUBCUTANEOUSLY IN THE MORNING--Dose change 11/5/20--updated med list--did not send prescription to the pharmacy    famotidine (PEPCID) 40 mg tablet Take 1 Tab by mouth two (2) times a day.  cholecalciferol, vitamin D3, (VITAMIN D3 PO) Take 1 Tab by mouth daily.  canagliflozin-metformin (INVOKAMET XR) 150-1,000 mg TBph Take 2 tablets/day, as directed.  glucose blood VI test strips (ONETOUCH ULTRA BLUE TEST STRIP) strip USE AS DIRECTED TWICE A DAY E11.9    Insulin Syringe-Needle U-100 (BD INSULIN SYRINGE ULTRA-FINE) 1 mL 31 gauge x 5/16 syrg USING ONCE DAILY WITH LANTUS    rosuvastatin (CRESTOR) 5 mg tablet Take 1 Tab by mouth daily.  semaglutide (OZEMPIC) 1 mg/dose (2 mg/1.5 mL) sub-q pen 1 mg by SubCUTAneous route every seven (7) days.     hydrOXYzine HCl (ATARAX) 25 mg tablet TAKE 1 TABLET BY MOUTH EVERY 8 HOURS AS NEEDED    Lancing Device misc Monitor blood sugar twice daily. Diagnosis code: E11.8    Blood-Glucose Meter (ONETOUCH ULTRA2) monitoring kit Monitor blood sugar twice daily. Diagnosis code: E11.9    lancets (ONE TOUCH DELICA) 33 gauge misc Monitor blood sugar twice daily. Diagnosis code: E11.9    acetaminophen (TYLENOL) 325 mg tablet Take  by mouth every four (4) hours as needed for Pain.  MELATONIN, BULK, Take 3 mg by mouth nightly as needed. No current facility-administered medications for this visit. No Known Allergies     Review of Systems: PER HPI    Physical Examination:  - GENERAL: NCAT, Appears well nourished   - EYES: EOMI, non-icteric, no proptosis   - Ear/Nose/Throat: NCAT, no visible inflammation or masses   - CARDIOVASCULAR: no cyanosis, no visible JVD   - RESPIRATORY: respiratory effort normal without any distress or labored breathing   - MUSCULOSKELETAL: Normal ROM of neck and upper extremities observed   - SKIN: No rash on face  - NEUROLOGIC:  No facial asymmetry (Cranial nerve 7 motor function), No gaze palsy   - PSYCHIATRIC: Normal affect, Normal insight and judgement       Data Reviewed:   Component      Latest Ref Rng & Units 10/30/2020 10/30/2020 10/30/2020 10/30/2020           8:54 AM  8:54 AM  8:54 AM  8:54 AM   Glucose      65 - 99 mg/dL  128 (H)     BUN      8 - 27 mg/dL  18     Creatinine      0.76 - 1.27 mg/dL  1.27     GFR est non-AA      >59 mL/min/1.73  54 (L)     GFR est AA      >59 mL/min/1.73  62     BUN/Creatinine ratio      10 - 24  14     Sodium      134 - 144 mmol/L  140     Potassium      3.5 - 5.2 mmol/L  3.9     Chloride      96 - 106 mmol/L  104     CO2      20 - 29 mmol/L  25     Calcium      8.6 - 10.2 mg/dL  9.5     Protein, total      6.0 - 8.5 g/dL  6.9     Albumin      3.7 - 4.7 g/dL  4.2     GLOBULIN, TOTAL      1.5 - 4.5 g/dL  2.7     A-G Ratio      1.2 - 2.2  1.6     Bilirubin, total      0.0 - 1.2 mg/dL  0.3     Alk.  phosphatase      39 - 117 IU/L  61     AST      0 - 40 IU/L  20     ALT      0 - 44 IU/L 19     Cholesterol, total      100 - 199 mg/dL 132      Triglyceride      0 - 149 mg/dL 129      HDL Cholesterol      >39 mg/dL 35 (L)      VLDL Cholesterol Les      5 - 40 mg/dL 23      LDL Cholesterol      0 - 99 mg/dL 74      Creatinine, urine      Not Estab. mg/dL    89.2   Microalbumin, urine      Not Estab. ug/mL    18.9   Microalbumin/Creat. Ratio      0 - 29 mg/g creat    21   Hemoglobin A1c, (calculated)      4.8 - 5.6 %   7.2 (H)    Estimated average glucose      mg/dL   160        Assessment/Plan:   1. Type 2 diabetes mellitus with microalbuminuria, with long-term current use of insulin ([facility]): his most recent Hgb A1c was 7.2% in 10/20 which is at goal of 7.5% or less. There are no trends to warrant changes in his regimen. - cont lantus 24 units in the morning  - cont invokamet /1000 mg 1 tab bid  - cont ozempic 1 mg 38 clicks weekly = 0.5 mg   - check bs twice daily  - foot exam done 1/20  - microalbumin 43 in 1/20 (off losartan 50 mg) but down to 21 in 10/20  - eye exam 3/19  - check Hgb A1c and bmp prior to next visit      2. Dyslipidemia: LDL 74 on crestor 5 mg daily in 11/20  - cont crestor 5 mg daily  - check lipids in 11/21      3. Essential hypertension: BP < 140/90 in 1/20 off losartan  - follow off meds for now        Patient Instructions   1) Your Hemoglobin A1c (3 month test of blood sugar) is 7.2% which is at goal of 7.5% or less so I will keep your diabetes regimen the same. 2) BUN and creatinine are markers of kidney function. Your values are normal.    3) ALT and AST are markers of liver function. Your values are normal.    4) Your cholesterol is at goal.    5) Your urine protein is normal off the losartan so it's fine to stay off this for now. 6) Please come for a follow up visit on 5/6/21 at 8:30am in our [place] office.     7) I put an order directly into the [employer] system to repeat your labs in the 1-2 weeks prior to your next visit so just ask for the order under my name and you will receive a courtesy reminder through [employer] to have these drawn.             Follow-up and Dispositions    · Return 5/6/21 at 8:30am.               Copy sent to:  Kishore Bazzi MD

## 2021-01-28 RX ORDER — SEMAGLUTIDE 1.34 MG/ML
1 INJECTION, SOLUTION SUBCUTANEOUS
Qty: 9 ML | Refills: 3 | Status: SHIPPED | OUTPATIENT
Start: 2021-01-28 | End: 2022-01-30

## 2021-02-02 RX ORDER — CANAGLIFLOZIN AND METFORMIN HYDROCHLORIDE 150; 1000 MG/1; MG/1
TABLET, FILM COATED, EXTENDED RELEASE ORAL
Qty: 60 EACH | Refills: 11 | Status: SHIPPED | OUTPATIENT
Start: 2021-02-02 | End: 2022-01-24 | Stop reason: CLARIF

## 2021-03-03 RX ORDER — ROSUVASTATIN CALCIUM 5 MG/1
5 TABLET, COATED ORAL DAILY
Qty: 90 TAB | Refills: 3 | Status: SHIPPED | OUTPATIENT
Start: 2021-03-03 | End: 2021-12-17

## 2021-03-26 RX ORDER — FAMOTIDINE 40 MG/1
TABLET, FILM COATED ORAL
Qty: 180 TAB | Refills: 3 | Status: SHIPPED | OUTPATIENT
Start: 2021-03-26 | End: 2022-01-30

## 2021-03-26 RX ORDER — FAMOTIDINE 40 MG/1
TABLET, FILM COATED ORAL
Qty: 180 TAB | Refills: 3 | Status: CANCELLED | OUTPATIENT
Start: 2021-03-26

## 2021-04-12 RX ORDER — INSULIN GLARGINE 100 [IU]/ML
INJECTION, SOLUTION SUBCUTANEOUS
Qty: 30 ML | Refills: 3 | Status: SHIPPED | OUTPATIENT
Start: 2021-04-12 | End: 2021-04-16 | Stop reason: SDUPTHER

## 2021-04-15 NOTE — TELEPHONE ENCOUNTER
4/15/2021  1:57 PM      Daniel Meehan with [facility] need a call back regarding     insulin glargine (Lantus U-100 Insulin) 100 unit/mL injection

## 2021-04-15 NOTE — TELEPHONE ENCOUNTER
Srinath Reyes (pharmacist) stated that the patient stated he is taking 34 units a day and not 24 units. They need a new script submitted. I left a message for patient to call to confirm.

## 2021-04-16 RX ORDER — INSULIN GLARGINE 100 [IU]/ML
INJECTION, SOLUTION SUBCUTANEOUS
Qty: 30 ML | Refills: 3 | Status: SHIPPED | OUTPATIENT
Start: 2021-04-16 | End: 2021-11-18

## 2021-04-16 NOTE — TELEPHONE ENCOUNTER
----- Message from Best Stinson sent at 4/16/2021  3:00 PM EDT -----  Regarding: Dr. Casey Hsieh: [phone]  Medication Refill    Caller (if not patient):PT      Relationship of caller (if not patient):n/a      Best contact number(s):[phone]      Name of medication and dosage if known:insulin glargine (Lantus U-100 Insulin) 100 unit/mL injection       Is patient out of this medication (yes/no):yes      Pharmacy name:[facility]    Pharmacy listed in chart? (yes/no):yes  Pharmacy phone number: [phone]  Fax:  [phone]       Details to clarify the request:Pt has a prescription for insulin from Dr. Rosemary Lua at the pharmacy however it is for 24 units when the pt normally takes 36 units and the price has increased significantly.        Best Stinson

## 2021-04-16 NOTE — TELEPHONE ENCOUNTER
----- Message from Efrain Lau sent at 4/16/2021  8:10 AM EDT -----  Regarding: Nurse Bustillos/Telephone  Contact: [phone]  Patient return call    Caller's first and last name and relationship (if not the patient):Pt      Best contact number(s):[phone]      Whose call is being returned:PRADEEP Castillo      Details to clarify the request:Pt is returning a call and would like a call back.        Efrain Lau    -

## 2021-04-16 NOTE — TELEPHONE ENCOUNTER
----- Message from Tutor Ro sent at 4/16/2021 11:35 AM EDT -----  Regarding: /Telephone  Caller's first and last name and relationship (if not the patient): N/A     Best contact number(s): [phone]    Whose call is being returned: PT     Details to clarify the request: returning the phone call that was given to him by the office.

## 2021-04-22 DIAGNOSIS — E78.5 DYSLIPIDEMIA: ICD-10-CM

## 2021-04-22 DIAGNOSIS — Z79.4 TYPE 2 DIABETES MELLITUS WITH MICROALBUMINURIA, WITH LONG-TERM CURRENT USE OF INSULIN (HCC): ICD-10-CM

## 2021-04-22 DIAGNOSIS — I10 ESSENTIAL HYPERTENSION: ICD-10-CM

## 2021-04-22 DIAGNOSIS — R80.9 TYPE 2 DIABETES MELLITUS WITH MICROALBUMINURIA, WITH LONG-TERM CURRENT USE OF INSULIN (HCC): ICD-10-CM

## 2021-04-22 DIAGNOSIS — E11.29 TYPE 2 DIABETES MELLITUS WITH MICROALBUMINURIA, WITH LONG-TERM CURRENT USE OF INSULIN (HCC): ICD-10-CM

## 2021-05-05 LAB
BUN SERPL-MCNC: 22 MG/DL (ref 8–27)
BUN/CREAT SERPL: 17 (ref 10–24)
CALCIUM SERPL-MCNC: 10.1 MG/DL (ref 8.6–10.2)
CHLORIDE SERPL-SCNC: 104 MMOL/L (ref 96–106)
CO2 SERPL-SCNC: 22 MMOL/L (ref 20–29)
CREAT SERPL-MCNC: 1.31 MG/DL (ref 0.76–1.27)
EST. AVERAGE GLUCOSE BLD GHB EST-MCNC: 137 MG/DL
GLUCOSE SERPL-MCNC: 139 MG/DL (ref 65–99)
HBA1C MFR BLD: 6.4 % (ref 4.8–5.6)
INTERPRETATION: NORMAL
POTASSIUM SERPL-SCNC: 4.8 MMOL/L (ref 3.5–5.2)
SODIUM SERPL-SCNC: 141 MMOL/L (ref 134–144)

## 2021-05-06 ENCOUNTER — VIRTUAL VISIT (OUTPATIENT)
Dept: ENDOCRINOLOGY | Age: 81
End: 2021-05-06
Payer: MEDICARE

## 2021-05-06 DIAGNOSIS — I10 ESSENTIAL HYPERTENSION: ICD-10-CM

## 2021-05-06 DIAGNOSIS — E11.29 TYPE 2 DIABETES MELLITUS WITH MICROALBUMINURIA, WITH LONG-TERM CURRENT USE OF INSULIN (HCC): Primary | ICD-10-CM

## 2021-05-06 DIAGNOSIS — R80.9 TYPE 2 DIABETES MELLITUS WITH MICROALBUMINURIA, WITH LONG-TERM CURRENT USE OF INSULIN (HCC): Primary | ICD-10-CM

## 2021-05-06 DIAGNOSIS — Z79.4 TYPE 2 DIABETES MELLITUS WITH MICROALBUMINURIA, WITH LONG-TERM CURRENT USE OF INSULIN (HCC): Primary | ICD-10-CM

## 2021-05-06 DIAGNOSIS — E78.5 DYSLIPIDEMIA: ICD-10-CM

## 2021-05-06 PROCEDURE — G8756 NO BP MEASURE DOC: HCPCS | Performed by: INTERNAL MEDICINE

## 2021-05-06 PROCEDURE — 99214 OFFICE O/P EST MOD 30 MIN: CPT | Performed by: INTERNAL MEDICINE

## 2021-05-06 PROCEDURE — 1101F PT FALLS ASSESS-DOCD LE1/YR: CPT | Performed by: INTERNAL MEDICINE

## 2021-05-06 PROCEDURE — G8427 DOCREV CUR MEDS BY ELIG CLIN: HCPCS | Performed by: INTERNAL MEDICINE

## 2021-05-06 PROCEDURE — G8432 DEP SCR NOT DOC, RNG: HCPCS | Performed by: INTERNAL MEDICINE

## 2021-05-06 NOTE — PROGRESS NOTES
Chief Complaint   Patient presents with    Diabetes     [email or web address]    Other     pcp and pharmacy confirmed       **THIS IS A VIRTUAL VISIT VIA A VIDEO SYNCHRONOUS DISCUSSION. PATIENT AGREED TO HAVE THEIR CARE DELIVERED OVER A FACETIME VISIT IN PLACE OF THEIR REGULARLY SCHEDULED OFFICE VISIT**    History of Present Illness: Aleyda Joyner (goes by Lakshmi Mckeon) is a 68 y.o. male here for follow up of diabetes. The lowest reading he has seen was 85 but all the rest are in the 90 to low 100s fasting. Has checked an occasional reading after dinner and all are under 150. Has not had his BP checked at home but it was good at PCP's office in the past 6 months. Compliant with crestor. May not be drinking enough water. Current Outpatient Medications   Medication Sig    insulin glargine (Lantus U-100 Insulin) 100 unit/mL injection INJECT 34 UNITS SUBCUTANEOUSLY EVERY MORNING    famotidine (PEPCID) 40 mg tablet TAKE 1 TABLET BY MOUTH TWICE A DAY    rosuvastatin (CRESTOR) 5 mg tablet Take 1 Tab by mouth daily.  canagliflozin-metformin (Invokamet XR) 150-1,000 mg TBph Take 2 tablets/day, as directed.  semaglutide (Ozempic) 1 mg/dose (2 mg/1.5 mL) sub-q pen 1 mg by SubCUTAneous route every seven (7) days. (Patient taking differently: 0.5 mg by SubCUTAneous route every seven (7) days.)    OneTouch Ultra Blue Test Strip strip USE AS DIRECTED TWICE A DAY E11.9    cholecalciferol, vitamin D3, (VITAMIN D3 PO) Take 1 Tab by mouth daily.  Insulin Syringe-Needle U-100 (BD INSULIN SYRINGE ULTRA-FINE) 1 mL 31 gauge x 5/16 syrg USING ONCE DAILY WITH LANTUS    Lancing Device misc Monitor blood sugar twice daily. Diagnosis code: E11.8    Blood-Glucose Meter (ONETOUCH ULTRA2) monitoring kit Monitor blood sugar twice daily. Diagnosis code: E11.9    lancets (ONE TOUCH DELICA) 33 gauge misc Monitor blood sugar twice daily.  Diagnosis code: E11.9    acetaminophen (TYLENOL) 325 mg tablet Take  by mouth every four (4) hours as needed for Pain. No current facility-administered medications for this visit. No Known Allergies     Review of Systems: PER HPI    Physical Examination:  - GENERAL: NCAT, Appears well nourished   - EYES: EOMI, non-icteric, no proptosis   - Ear/Nose/Throat: NCAT, no visible inflammation or masses   - CARDIOVASCULAR: no cyanosis, no visible JVD   - RESPIRATORY: respiratory effort normal without any distress or labored breathing   - MUSCULOSKELETAL: Normal ROM of neck and upper extremities observed   - SKIN: No rash on face  - NEUROLOGIC:  No facial asymmetry (Cranial nerve 7 motor function), No gaze palsy   - PSYCHIATRIC: Normal affect, Normal insight and judgement       Data Reviewed:   Component      Latest Ref Rng & Units 5/4/2021 5/4/2021           9:17 AM  9:17 AM   Glucose      65 - 99 mg/dL  139 (H)   BUN      8 - 27 mg/dL  22   Creatinine      0.76 - 1.27 mg/dL  1.31 (H)   GFR est non-AA      >59 mL/min/1.73  52 (L)   GFR est AA      >59 mL/min/1.73  60   BUN/Creatinine ratio      10 - 24  17   Sodium      134 - 144 mmol/L  141   Potassium      3.5 - 5.2 mmol/L  4.8   Chloride      96 - 106 mmol/L  104   CO2      20 - 29 mmol/L  22   Calcium      8.6 - 10.2 mg/dL  10.1   Hemoglobin A1c, (calculated)      4.8 - 5.6 % 6.4 (H)    Estimated average glucose      mg/dL 137        Assessment/Plan:     1. Type 2 diabetes mellitus with microalbuminuria, with long-term [facility] his most recent Hgb A1c was 6.4% in 4/21 down from 7.2% in 10/20 which is at goal of 7.5% or less. There are no trends to warrant changes in his regimen. - cont lantus 34 units in the morning  - cont invokamet /1000 mg 1 tab bid  - cont ozempic 1 mg 38 clicks weekly = 0.5 mg   - check bs twice daily  - foot exam done 1/20  - microalbumin 43 in 1/20 (off losartan 50 mg) but down to 21 in 10/20  - eye exam 3/19  - check Hgb A1c and cmp and microalbumin prior to next visit      2.  Dyslipidemia: LDL 74 on crestor 5 mg daily in 11/20  - cont crestor 5 mg daily  - check lipids prior to next visit        3. Essential hypertension: BP < 140/90 in 1/20 off losartan  - follow off meds for now      Patient Instructions   1) Your Hemoglobin A1c (3 month test of blood sugar) is very good at 6.4%. If you have 2 or more readings under 90 in a week, then decrease your insulin by 4 units every week. 2) Your creatinine (kidney test) was slightly high due to mild dehydration. Drink at least 4 8oz glasses of water everyday to stay hydrated to prevent your creatinine level from going higher. 3) Please come for a follow up visit on 11/16/21 at 10:30am in our [place] office. 4) I put an order directly into the [employer] system to repeat your labs in the 1-2 weeks prior to your next visit so just ask for the order under my name and you will receive a courtesy reminder through [employer] to have these drawn.               Follow-up and Dispositions    · Return 11/16/21 at 10:30am.               Copy sent to:  Malgorzata Zuñiga MD    Lab follow up: 9/17/21  Received labs from PCP from 9/15/21:  - Hgb A1c 6.5%  - lipids: total 135,  HDL 35, , LDL 70  - ALT 22, AST 22  - BUN/Cr 20/1.11  - microalbumin 18

## 2021-05-06 NOTE — PATIENT INSTRUCTIONS
1) Your Hemoglobin A1c (3 month test of blood sugar) is very good at 6.4%. If you have 2 or more readings under 90 in a week, then decrease your insulin by 4 units every week. 2) Your creatinine (kidney test) was slightly high due to mild dehydration. Drink at least 4 8oz glasses of water everyday to stay hydrated to prevent your creatinine level from going higher. 3) Please come for a follow up visit on 11/16/21 at 10:30am in our [place] office. 4) I put an order directly into the [employer] system to repeat your labs in the 1-2 weeks prior to your next visit so just ask for the order under my name and you will receive a courtesy reminder through [employer] to have these drawn.

## 2021-11-02 DIAGNOSIS — E11.29 TYPE 2 DIABETES MELLITUS WITH MICROALBUMINURIA, WITH LONG-TERM CURRENT USE OF INSULIN (HCC): ICD-10-CM

## 2021-11-02 DIAGNOSIS — I10 ESSENTIAL HYPERTENSION: ICD-10-CM

## 2021-11-02 DIAGNOSIS — Z79.4 TYPE 2 DIABETES MELLITUS WITH MICROALBUMINURIA, WITH LONG-TERM CURRENT USE OF INSULIN (HCC): ICD-10-CM

## 2021-11-02 DIAGNOSIS — R80.9 TYPE 2 DIABETES MELLITUS WITH MICROALBUMINURIA, WITH LONG-TERM CURRENT USE OF INSULIN (HCC): ICD-10-CM

## 2021-11-02 DIAGNOSIS — E78.5 DYSLIPIDEMIA: ICD-10-CM

## 2021-11-11 LAB
ALBUMIN SERPL-MCNC: 4.7 G/DL (ref 3.7–4.7)
ALBUMIN/CREAT UR: 33 MG/G CREAT (ref 0–29)
ALBUMIN/GLOB SERPL: 2 {RATIO} (ref 1.2–2.2)
ALP SERPL-CCNC: 59 IU/L (ref 44–121)
ALT SERPL-CCNC: 17 IU/L (ref 0–44)
AST SERPL-CCNC: 23 IU/L (ref 0–40)
BILIRUB SERPL-MCNC: 0.5 MG/DL (ref 0–1.2)
BUN SERPL-MCNC: 18 MG/DL (ref 8–27)
BUN/CREAT SERPL: 15 (ref 10–24)
CALCIUM SERPL-MCNC: 10 MG/DL (ref 8.6–10.2)
CHLORIDE SERPL-SCNC: 102 MMOL/L (ref 96–106)
CHOLEST SERPL-MCNC: 144 MG/DL (ref 100–199)
CO2 SERPL-SCNC: 25 MMOL/L (ref 20–29)
CREAT SERPL-MCNC: 1.2 MG/DL (ref 0.76–1.27)
CREAT UR-MCNC: 76 MG/DL
EST. AVERAGE GLUCOSE BLD GHB EST-MCNC: 134 MG/DL
GLOBULIN SER CALC-MCNC: 2.4 G/DL (ref 1.5–4.5)
GLUCOSE SERPL-MCNC: 98 MG/DL (ref 65–99)
HBA1C MFR BLD: 6.3 % (ref 4.8–5.6)
HDLC SERPL-MCNC: 40 MG/DL
IMP & REVIEW OF LAB RESULTS: NORMAL
INTERPRETATION: NORMAL
LDLC SERPL CALC-MCNC: 83 MG/DL (ref 0–99)
MICROALBUMIN UR-MCNC: 24.9 UG/ML
POTASSIUM SERPL-SCNC: 3.9 MMOL/L (ref 3.5–5.2)
PROT SERPL-MCNC: 7.1 G/DL (ref 6–8.5)
SODIUM SERPL-SCNC: 140 MMOL/L (ref 134–144)
TRIGL SERPL-MCNC: 114 MG/DL (ref 0–149)
VLDLC SERPL CALC-MCNC: 21 MG/DL (ref 5–40)

## 2021-11-15 ENCOUNTER — TELEPHONE (OUTPATIENT)
Dept: ENDOCRINOLOGY | Age: 81
End: 2021-11-15

## 2021-11-15 NOTE — TELEPHONE ENCOUNTER
Ila Monte,    Please let him know that I don't have any openings today but could see him at 12:30pm on Thursday. Can he take that slot?

## 2021-11-15 NOTE — TELEPHONE ENCOUNTER
While calling patient to confirm his appt with Dr. Will Elizondo tomorrow (Tuesday, 11/16/21) at 10:30am, the patient told me that they are dealing with somewhat of a family emergency and could he possibly be seen either today, Monday  11/15, or Thursday, 11/18? Please call patient if Dr. Cervantes Daily is able to work him into his schedule on either of these days.     He can be reached at [phone]

## 2021-11-18 ENCOUNTER — OFFICE VISIT (OUTPATIENT)
Dept: ENDOCRINOLOGY | Age: 81
End: 2021-11-18
Payer: MEDICARE

## 2021-11-18 VITALS
HEART RATE: 67 BPM | BODY MASS INDEX: 25.79 KG/M2 | SYSTOLIC BLOOD PRESSURE: 139 MMHG | HEIGHT: 68 IN | WEIGHT: 170.2 LBS | DIASTOLIC BLOOD PRESSURE: 88 MMHG

## 2021-11-18 DIAGNOSIS — R80.9 TYPE 2 DIABETES MELLITUS WITH MICROALBUMINURIA, WITH LONG-TERM CURRENT USE OF INSULIN (HCC): Primary | ICD-10-CM

## 2021-11-18 DIAGNOSIS — E11.29 TYPE 2 DIABETES MELLITUS WITH MICROALBUMINURIA, WITH LONG-TERM CURRENT USE OF INSULIN (HCC): Primary | ICD-10-CM

## 2021-11-18 DIAGNOSIS — Z79.4 TYPE 2 DIABETES MELLITUS WITH MICROALBUMINURIA, WITH LONG-TERM CURRENT USE OF INSULIN (HCC): Primary | ICD-10-CM

## 2021-11-18 DIAGNOSIS — E78.5 DYSLIPIDEMIA: ICD-10-CM

## 2021-11-18 DIAGNOSIS — I10 ESSENTIAL HYPERTENSION: ICD-10-CM

## 2021-11-18 PROCEDURE — G8419 CALC BMI OUT NRM PARAM NOF/U: HCPCS | Performed by: INTERNAL MEDICINE

## 2021-11-18 PROCEDURE — G8752 SYS BP LESS 140: HCPCS | Performed by: INTERNAL MEDICINE

## 2021-11-18 PROCEDURE — 99214 OFFICE O/P EST MOD 30 MIN: CPT | Performed by: INTERNAL MEDICINE

## 2021-11-18 PROCEDURE — G8427 DOCREV CUR MEDS BY ELIG CLIN: HCPCS | Performed by: INTERNAL MEDICINE

## 2021-11-18 PROCEDURE — G8432 DEP SCR NOT DOC, RNG: HCPCS | Performed by: INTERNAL MEDICINE

## 2021-11-18 PROCEDURE — 1101F PT FALLS ASSESS-DOCD LE1/YR: CPT | Performed by: INTERNAL MEDICINE

## 2021-11-18 PROCEDURE — G8754 DIAS BP LESS 90: HCPCS | Performed by: INTERNAL MEDICINE

## 2021-11-18 PROCEDURE — G8536 NO DOC ELDER MAL SCRN: HCPCS | Performed by: INTERNAL MEDICINE

## 2021-11-18 RX ORDER — INSULIN GLARGINE 100 [IU]/ML
INJECTION, SOLUTION SUBCUTANEOUS
Qty: 30 ML | Refills: 3
Start: 2021-11-18 | End: 2021-11-18

## 2021-11-18 RX ORDER — INSULIN GLARGINE 100 [IU]/ML
INJECTION, SOLUTION SUBCUTANEOUS
Qty: 30 ML | Refills: 3
Start: 2021-11-18 | End: 2021-12-15

## 2021-11-18 NOTE — PATIENT INSTRUCTIONS
1) Your Hemoglobin A1c (3 month test of blood sugar) is very good at 6.3% but lower than I need it to be and would prefer this closer to 6.5-7.5% just to avoid the risk of low blood sugars. 2) Decrease your lantus to 22 units every morning. As long as this keeps your morning sugars mostly between 100-130, this should be a good dose. If you have 2 or more readings under 100 in a week, then decrease your insulin by 2 units every week as needed    3) Your blood pressure is controlled and cholesterol is at goal and liver and kidney are normal.    4) Your Microalbumin/creatinine ratio is a marker of the amount of protein in your urine. Goal is less than 30. Your value is just barely abnormal at 33 up from 18 in 9/21 but down from 43 in 1/20 so we'll follow this for now as previously you took losartan to help with kidney protection and we may need to go back on this in the future.

## 2021-11-18 NOTE — PROGRESS NOTES
Chief Complaint   Patient presents with    Diabetes     pcp and pharmacy confirmed    Other     consent obtain for eye report     History of Present Illness: Izabela Polo (goes by Hugh Payne) is a 80 y.o. male here for follow up of diabetes. He decreased his lantus from 34 units to 26 units over a month ago as he was having more sugars in the 70s-80s and currently is having fasting sugars mostly in the  range but still some in the 80s and one of 77. His weight was 185 in 1/20 at last in person visit with Dr. Annette Cazares and down to 170 today and doesn't have much of an appetite on the ozempic 38 clicks per week out of the 1 mg pen. Compliant with crestor. Current Outpatient Medications   Medication Sig    insulin glargine (Lantus U-100 Insulin) 100 unit/mL injection INJECT 26 UNITS SUBCUTANEOUSLY EVERY MORNING--Dose change 11/18/21--updated med list--did not send prescription to the pharmacy    famotidine (PEPCID) 40 mg tablet TAKE 1 TABLET BY MOUTH TWICE A DAY    rosuvastatin (CRESTOR) 5 mg tablet Take 1 Tab by mouth daily.  semaglutide (Ozempic) 1 mg/dose (2 mg/1.5 mL) sub-q pen 1 mg by SubCUTAneous route every seven (7) days. (Patient taking differently: 0.5 mg by SubCUTAneous route every seven (7) days.)    OneTouch Ultra Blue Test Strip strip USE AS DIRECTED TWICE A DAY E11.9    cholecalciferol, vitamin D3, (VITAMIN D3 PO) Take 1,000 mcg by mouth daily.  Insulin Syringe-Needle U-100 (BD INSULIN SYRINGE ULTRA-FINE) 1 mL 31 gauge x 5/16 syrg USING ONCE DAILY WITH LANTUS    Lancing Device misc Monitor blood sugar twice daily. Diagnosis code: E11.8    Blood-Glucose Meter (ONETOUCH ULTRA2) monitoring kit Monitor blood sugar twice daily. Diagnosis code: E11.9    lancets (ONE TOUCH DELICA) 33 gauge misc Monitor blood sugar twice daily. Diagnosis code: E11.9    acetaminophen (TYLENOL) 325 mg tablet Take  by mouth every four (4) hours as needed for Pain.     canagliflozin-metformin (Invokamet XR) 150-1,000 mg TBph Take 2 tablets/day, as directed. No current facility-administered medications for this visit. No Known Allergies     Review of Systems: PER HPI    Physical Examination:  Blood pressure 139/88, pulse 67, height 5' 8\" (1.727 m), weight 170 lb 3.2 oz (77.2 kg). - General: pleasant, no distress, good eye contact   - Neck: no carotid bruits  - Cardiovascular: regular, normal rate, nl s1 and s2, no m/r/g,   - Respiratory: clear bilaterally  - Integumentary: no edema,   - Psychiatric: normal mood and affect    Diabetic foot exam:     Left Foot:   Visual Exam: callous - mild   Pulse DP: 2+ (normal)   Filament test: normal sensation    Vibratory sensation: diminished      Right Foot:   Visual Exam: callous - mild   Pulse DP: 2+ (normal)   Filament test: normal sensation    Vibratory sensation: diminished        Data Reviewed:   Component      Latest Ref Rng & Units 11/10/2021 11/10/2021 11/10/2021 11/10/2021           9:28 AM  9:28 AM  9:28 AM  9:28 AM   Glucose      65 - 99 mg/dL 98      BUN      8 - 27 mg/dL 18      Creatinine      0.76 - 1.27 mg/dL 1.20      GFR est non-AA      >59 mL/min/1.73 57 (L)      GFR est AA      >59 mL/min/1.73 66      BUN/Creatinine ratio      10 - 24 15      Sodium      134 - 144 mmol/L 140      Potassium      3.5 - 5.2 mmol/L 3.9      Chloride      96 - 106 mmol/L 102      CO2      20 - 29 mmol/L 25      Calcium      8.6 - 10.2 mg/dL 10.0      Protein, total      6.0 - 8.5 g/dL 7.1      Albumin      3.7 - 4.7 g/dL 4.7      GLOBULIN, TOTAL      1.5 - 4.5 g/dL 2.4      A-G Ratio      1.2 - 2.2 2.0      Bilirubin, total      0.0 - 1.2 mg/dL 0.5      Alk.  phosphatase      44 - 121 IU/L 59      AST      0 - 40 IU/L 23      ALT      0 - 44 IU/L 17      Cholesterol, total      100 - 199 mg/dL  144     Triglyceride      0 - 149 mg/dL  114     HDL Cholesterol      >39 mg/dL  40     VLDL, calculated      5 - 40 mg/dL  21     LDL, calculated      0 - 99 mg/dL  83 Creatinine, urine      Not Estab. mg/dL    76.0   Microalbumin, urine      Not Estab. ug/mL    24.9   Microalbumin/Creat. Ratio      0 - 29 mg/g creat    33 (H)   Hemoglobin A1c, (calculated)      4.8 - 5.6 %   6.3 (H)    Estimated average glucose      mg/dL   134        Assessment/Plan:     1. Type 2 diabetes mellitus with microalbuminuria, with long-term current use of insulin [facility] his most recent Hgb A1c was 6.3% in 11/21 down from 6.5% in 9/21 up from 6.4% in 4/21 down from 7.2% in 10/20 which is at goal of 7.5% or less but lower than I need it to be. Will decrease lantus to avoid further hypoglycemia. - decrease lantus to 22 units in the morning  - cont invokamet /1000 mg 1 tab bid  - cont ozempic 1 mg 38 clicks weekly = 0.5 mg   - check bs twice daily  - foot exam done 11/21  - microalbumin 43 in 1/20 (off losartan 50 mg) but down to 21 in 10/20 and 18 in 9/21 but up to 33 in 11/21  - eye exam 2021--will obtain report  - check Hgb A1c and cmp and microalbumin prior to next visit        2. Dyslipidemia: LDL 74 on crestor 5 mg daily in 11/20 and 70 in 9/21 and 83 in 11/21.  - cont crestor 5 mg daily  - check lipids prior to next visit        3. Essential hypertension: BP < 140/90 in 1/20 off losartan  - follow off meds for now      Patient Instructions   1) Your Hemoglobin A1c (3 month test of blood sugar) is very good at 6.3% but lower than I need it to be and would prefer this closer to 6.5-7.5% just to avoid the risk of low blood sugars. 2) Decrease your lantus to 22 units every morning. As long as this keeps your morning sugars mostly between 100-130, this should be a good dose. If you have 2 or more readings under 100 in a week, then decrease your insulin by 2 units every week as needed    3) Your blood pressure is controlled and cholesterol is at goal and liver and kidney are normal.    4) Your Microalbumin/creatinine ratio is a marker of the amount of protein in your urine.   Goal is less than 30. Your value is just barely abnormal at 33 up from 18 in 9/21 but down from 43 in 1/20 so we'll follow this for now as previously you took losartan to help with kidney protection and we may need to go back on this in the future. Follow-up and Dispositions    · Return in about 6 months (around 5/18/2022).                Copy sent to:  Daniel Williamson MD

## 2021-12-15 RX ORDER — INSULIN GLARGINE 100 [IU]/ML
INJECTION, SOLUTION SUBCUTANEOUS
Qty: 30 ML | Refills: 3
Start: 2021-12-15 | End: 2022-04-16 | Stop reason: SDUPTHER

## 2021-12-17 RX ORDER — ROSUVASTATIN CALCIUM 5 MG/1
TABLET, COATED ORAL
Qty: 90 TABLET | Refills: 3 | Status: SHIPPED | OUTPATIENT
Start: 2021-12-17

## 2022-01-21 ENCOUNTER — TELEPHONE (OUTPATIENT)
Dept: ENDOCRINOLOGY | Age: 82
End: 2022-01-21

## 2022-01-21 NOTE — TELEPHONE ENCOUNTER
Pt notified of message per Dr. Maverick Nelson and voiced understanding of what was read to him. He stated he has enough mediation to last his 3 weeks.

## 2022-01-21 NOTE — TELEPHONE ENCOUNTER
Please call pt to let him know he has an unread message in [employer]:    Can you check with them ([facility]) and find out if xigduo or synjardy XR is preferred over invokamet XR and if so, either of these will be acceptable alternatives as likely I won't be able to keep you on invokamet XR unless you have tried and failed their preferred medication.  Let me know when you have a chance.

## 2022-01-24 RX ORDER — EMPAGLIFLOZIN, METFORMIN HYDROCHLORIDE 12.5; 1 MG/1; MG/1
TABLET, EXTENDED RELEASE ORAL
Qty: 60 EACH | Refills: 11 | Status: SHIPPED | OUTPATIENT
Start: 2022-01-24 | End: 2022-03-05

## 2022-01-30 RX ORDER — SEMAGLUTIDE 1.34 MG/ML
1 INJECTION, SOLUTION SUBCUTANEOUS
Qty: 3 EACH | Refills: 3 | Status: SHIPPED | OUTPATIENT
Start: 2022-01-30

## 2022-01-30 RX ORDER — FAMOTIDINE 40 MG/1
TABLET, FILM COATED ORAL
Qty: 180 TABLET | Refills: 3 | Status: SHIPPED | OUTPATIENT
Start: 2022-01-30 | End: 2022-10-10

## 2022-03-03 ENCOUNTER — TELEPHONE (OUTPATIENT)
Dept: ENDOCRINOLOGY | Age: 82
End: 2022-03-03

## 2022-03-04 NOTE — TELEPHONE ENCOUNTER
I received a fax from [employer] stating that \"alternative requested\" for his synjardy XR 12.5/1000 mg tabs. I had just changed from invokamet to synjardy XR based on his [employer] message to me on 1/24/22 stating that his insurance prefers synjardy or xigduo over invokamet. However, he did write that his insurance only covers 1 tab daily and I have him on 2 tabs daily, #60. Can you find out if we need to do a PA for the quantity and if so, please initiate this? Thanks.

## 2022-03-04 NOTE — TELEPHONE ENCOUNTER
Per Tressa Somers the [facility] [place] is covered however the insurance is will pay not cover a 30 day supply. They will only cover a 90 day  supply and his co-pay is $75. She stated that they will switch his script to the 90 day because he already have refills.

## 2022-03-05 RX ORDER — EMPAGLIFLOZIN, METFORMIN HYDROCHLORIDE 12.5; 1 MG/1; MG/1
TABLET, EXTENDED RELEASE ORAL
Qty: 180 EACH | Refills: 3 | Status: SHIPPED | OUTPATIENT
Start: 2022-03-05 | End: 2022-05-23

## 2022-03-18 PROBLEM — E11.21 TYPE 2 DIABETES WITH NEPHROPATHY (HCC): Status: ACTIVE | Noted: 2019-09-11

## 2022-03-19 PROBLEM — E11.8 TYPE 2 DIABETES MELLITUS WITH COMPLICATION, WITH LONG-TERM CURRENT USE OF INSULIN (HCC): Status: ACTIVE | Noted: 2017-03-01

## 2022-03-19 PROBLEM — Z79.4 TYPE 2 DIABETES MELLITUS WITH COMPLICATION, WITH LONG-TERM CURRENT USE OF INSULIN (HCC): Status: ACTIVE | Noted: 2017-03-01

## 2022-03-21 RX ORDER — BLOOD SUGAR DIAGNOSTIC
STRIP MISCELLANEOUS
Qty: 200 STRIP | Refills: 3 | Status: SHIPPED | OUTPATIENT
Start: 2022-03-21 | End: 2022-06-14 | Stop reason: SDUPTHER

## 2022-04-16 RX ORDER — INSULIN GLARGINE 100 [IU]/ML
INJECTION, SOLUTION SUBCUTANEOUS
Qty: 30 ML | Refills: 3 | Status: SHIPPED | OUTPATIENT
Start: 2022-04-16 | End: 2022-05-23

## 2022-05-04 DIAGNOSIS — E78.5 DYSLIPIDEMIA: ICD-10-CM

## 2022-05-04 DIAGNOSIS — I10 ESSENTIAL HYPERTENSION: ICD-10-CM

## 2022-05-04 DIAGNOSIS — R80.9 TYPE 2 DIABETES MELLITUS WITH MICROALBUMINURIA, WITH LONG-TERM CURRENT USE OF INSULIN (HCC): ICD-10-CM

## 2022-05-04 DIAGNOSIS — E11.29 TYPE 2 DIABETES MELLITUS WITH MICROALBUMINURIA, WITH LONG-TERM CURRENT USE OF INSULIN (HCC): ICD-10-CM

## 2022-05-04 DIAGNOSIS — Z79.4 TYPE 2 DIABETES MELLITUS WITH MICROALBUMINURIA, WITH LONG-TERM CURRENT USE OF INSULIN (HCC): ICD-10-CM

## 2022-05-18 LAB
ALBUMIN SERPL-MCNC: 4.7 G/DL (ref 3.7–4.7)
ALBUMIN/CREAT UR: 22 MG/G CREAT (ref 0–29)
ALBUMIN/GLOB SERPL: 2.2 {RATIO} (ref 1.2–2.2)
ALP SERPL-CCNC: 51 IU/L (ref 44–121)
ALT SERPL-CCNC: 14 IU/L (ref 0–44)
AST SERPL-CCNC: 17 IU/L (ref 0–40)
BILIRUB SERPL-MCNC: 0.3 MG/DL (ref 0–1.2)
BUN SERPL-MCNC: 17 MG/DL (ref 8–27)
BUN/CREAT SERPL: 14 (ref 10–24)
CALCIUM SERPL-MCNC: 9.6 MG/DL (ref 8.6–10.2)
CHLORIDE SERPL-SCNC: 102 MMOL/L (ref 96–106)
CHOLEST SERPL-MCNC: 125 MG/DL (ref 100–199)
CO2 SERPL-SCNC: 24 MMOL/L (ref 20–29)
CREAT SERPL-MCNC: 1.18 MG/DL (ref 0.76–1.27)
CREAT UR-MCNC: 66.1 MG/DL
EGFR: 63 ML/MIN/1.73
EST. AVERAGE GLUCOSE BLD GHB EST-MCNC: 146 MG/DL
GLOBULIN SER CALC-MCNC: 2.1 G/DL (ref 1.5–4.5)
GLUCOSE SERPL-MCNC: 123 MG/DL (ref 65–99)
HBA1C MFR BLD: 6.7 % (ref 4.8–5.6)
HDLC SERPL-MCNC: 33 MG/DL
IMP & REVIEW OF LAB RESULTS: NORMAL
LDLC SERPL CALC-MCNC: 67 MG/DL (ref 0–99)
MICROALBUMIN UR-MCNC: 14.8 UG/ML
POTASSIUM SERPL-SCNC: 4.2 MMOL/L (ref 3.5–5.2)
PROT SERPL-MCNC: 6.8 G/DL (ref 6–8.5)
SODIUM SERPL-SCNC: 139 MMOL/L (ref 134–144)
TRIGL SERPL-MCNC: 144 MG/DL (ref 0–149)
VLDLC SERPL CALC-MCNC: 25 MG/DL (ref 5–40)

## 2022-05-23 ENCOUNTER — OFFICE VISIT (OUTPATIENT)
Dept: ENDOCRINOLOGY | Age: 82
End: 2022-05-23
Payer: MEDICARE

## 2022-05-23 VITALS
SYSTOLIC BLOOD PRESSURE: 138 MMHG | DIASTOLIC BLOOD PRESSURE: 84 MMHG | HEIGHT: 68 IN | BODY MASS INDEX: 25.19 KG/M2 | WEIGHT: 166.2 LBS | HEART RATE: 73 BPM

## 2022-05-23 DIAGNOSIS — Z79.4 TYPE 2 DIABETES MELLITUS WITH MICROALBUMINURIA, WITH LONG-TERM CURRENT USE OF INSULIN (HCC): Primary | ICD-10-CM

## 2022-05-23 DIAGNOSIS — R80.9 TYPE 2 DIABETES MELLITUS WITH MICROALBUMINURIA, WITH LONG-TERM CURRENT USE OF INSULIN (HCC): Primary | ICD-10-CM

## 2022-05-23 DIAGNOSIS — E78.5 DYSLIPIDEMIA: ICD-10-CM

## 2022-05-23 DIAGNOSIS — E11.29 TYPE 2 DIABETES MELLITUS WITH MICROALBUMINURIA, WITH LONG-TERM CURRENT USE OF INSULIN (HCC): Primary | ICD-10-CM

## 2022-05-23 DIAGNOSIS — I10 ESSENTIAL HYPERTENSION: ICD-10-CM

## 2022-05-23 PROCEDURE — G8432 DEP SCR NOT DOC, RNG: HCPCS | Performed by: INTERNAL MEDICINE

## 2022-05-23 PROCEDURE — G8536 NO DOC ELDER MAL SCRN: HCPCS | Performed by: INTERNAL MEDICINE

## 2022-05-23 PROCEDURE — G8754 DIAS BP LESS 90: HCPCS | Performed by: INTERNAL MEDICINE

## 2022-05-23 PROCEDURE — 99214 OFFICE O/P EST MOD 30 MIN: CPT | Performed by: INTERNAL MEDICINE

## 2022-05-23 PROCEDURE — G8427 DOCREV CUR MEDS BY ELIG CLIN: HCPCS | Performed by: INTERNAL MEDICINE

## 2022-05-23 PROCEDURE — 3044F HG A1C LEVEL LT 7.0%: CPT | Performed by: INTERNAL MEDICINE

## 2022-05-23 PROCEDURE — G8417 CALC BMI ABV UP PARAM F/U: HCPCS | Performed by: INTERNAL MEDICINE

## 2022-05-23 PROCEDURE — G8752 SYS BP LESS 140: HCPCS | Performed by: INTERNAL MEDICINE

## 2022-05-23 PROCEDURE — 1101F PT FALLS ASSESS-DOCD LE1/YR: CPT | Performed by: INTERNAL MEDICINE

## 2022-05-23 RX ORDER — INSULIN GLARGINE 100 [IU]/ML
INJECTION, SOLUTION SUBCUTANEOUS
Qty: 30 ML | Refills: 3
Start: 2022-05-23 | End: 2022-08-02

## 2022-05-23 RX ORDER — EMPAGLIFLOZIN, METFORMIN HYDROCHLORIDE 12.5; 1 MG/1; MG/1
TABLET, EXTENDED RELEASE ORAL
Qty: 180 EACH | Refills: 3
Start: 2022-05-23

## 2022-05-23 NOTE — PATIENT INSTRUCTIONS
1) Stop the evening dose of synjardy and just take 1 tab in the morning. The prescription will still say 2 tabs daily but only take 1 tab in the morning to help with cost and decrease the change of low sugars during the day and dehydration in the heat. We'll see if this helps with the leg symptoms you have been experiencing at night. 2) Stay on lantus 16 units in the morning and ozempic 38 clicks every Friday. If your fasting sugars are staying over 130 2 or more times in a week, then increase the lantus back to 18 units to keep your sugars 100-130 in the morning. 3) BUN and creatinine are markers of kidney function. Your values are normal.    4) ALT and AST are markers of liver function. Your values are normal.    5) Microalbumin/creatinine ratio is a marker of the amount of protein in your urine. Goal is less than 30. Your value is normal. This indicates that your kidneys are not being affected by your diabetes and/or blood pressure. 6) Your cholesterol has improved and is at goal.    7) Your weight is healthy for your height.

## 2022-05-23 NOTE — PROGRESS NOTES
Chief Complaint   Patient presents with    Diabetes     pcp and pharmacy confirmed     History of Present Illness: Hieu Dyson (goes by [employer]) is a 80 y.o. male here for follow up of diabetes. Weight down 4 lbs since last visit in 11/21. Was able to taper down on his lantus from 22 units to 16 units as of 12/16/21 and has remained on this dose. This winter we changed from invokamet to synjardy due to insurance. Still taking 38 clicks of ozempic every Friday. Fasting sugars are mostly in the 100-130 range unless he eats something heavier late at night and then it can be closer to 140. Has had a few in the  range but none under 90. Sometimes feels like his sugar may be going low during the day. Has had some leg cramping at night that is affecting his sleep and we'll see if cutting back on the synjardy from 1 tab bid to 1 tab in the morning will help. Compliant with crestor. Current Outpatient Medications   Medication Sig    insulin glargine (Lantus U-100 Insulin) 100 unit/mL injection INJECT 16 UNITS SUBCUTANEOUSLY EVERY MORNING--Dose change 05/23/22--updated med list--did not send prescription to the pharmacy    [employer] Ultra Test strip USE AS DIRECTED TWICE A DAY E11.9    Synjardy XR 12.5-1,000 mg TBph Take 2 tabs daily--replaces invokamet XR    semaglutide (Ozempic) 1 mg/dose (4 mg/3 mL) pnij 1 mg by SubCUTAneous route every seven (7) days. Dispense 3 pens = 9 ml for a 90 day supply    famotidine (PEPCID) 40 mg tablet TAKE 1 TABLET BY MOUTH TWICE A DAY    rosuvastatin (CRESTOR) 5 mg tablet TAKE 1 TABLET BY MOUTH EVERY DAY    cholecalciferol, vitamin D3, (VITAMIN D3 PO) Take 1,000 mcg by mouth daily.  Insulin Syringe-Needle U-100 (BD INSULIN SYRINGE ULTRA-FINE) 1 mL 31 gauge x 5/16 syrg USING ONCE DAILY WITH LANTUS    Lancing Device misc Monitor blood sugar twice daily.  Diagnosis code: E11.8    Blood-Glucose Meter (ONETOUCH ULTRA2) monitoring kit Monitor blood sugar twice daily. Diagnosis code: E11.9    lancets (ONE TOUCH DELICA) 33 gauge misc Monitor blood sugar twice daily. Diagnosis code: E11.9    acetaminophen (TYLENOL) 325 mg tablet Take  by mouth every four (4) hours as needed for Pain. No current facility-administered medications for this visit. No Known Allergies     Review of Systems: PER HPI    Physical Examination:  Blood pressure 138/84, pulse 73, height 5' 8\" (1.727 m), weight 166 lb 3.2 oz (75.4 kg). - General: pleasant, no distress, good eye contact   - Neck: no carotid bruits  - Cardiovascular: regular, normal rate, nl s1 and s2, no m/r/g,   - Respiratory: clear bilaterally  - Integumentary: no edema,   - Psychiatric: normal mood and affect    Data Reviewed:   Component      Latest Ref Rng & Units 5/17/2022 5/17/2022 5/17/2022 5/17/2022           8:00 AM  8:00 AM  8:00 AM  8:00 AM   Glucose      65 - 99 mg/dL   123 (H)    BUN      8 - 27 mg/dL   17    Creatinine      0.76 - 1.27 mg/dL   1.18    eGFR      >59 mL/min/1.73   63    BUN/Creatinine ratio      10 - 24   14    Sodium      134 - 144 mmol/L   139    Potassium      3.5 - 5.2 mmol/L   4.2    Chloride      96 - 106 mmol/L   102    CO2      20 - 29 mmol/L   24    Calcium      8.6 - 10.2 mg/dL   9.6    Protein, total      6.0 - 8.5 g/dL   6.8    Albumin      3.7 - 4.7 g/dL   4.7    GLOBULIN, TOTAL      1.5 - 4.5 g/dL   2.1    A-G Ratio      1.2 - 2.2   2.2    Bilirubin, total      0.0 - 1.2 mg/dL   0.3    Alk. phosphatase      44 - 121 IU/L   51    AST      0 - 40 IU/L   17    ALT      0 - 44 IU/L   14    Cholesterol, total      100 - 199 mg/dL    125   Triglyceride      0 - 149 mg/dL    144   HDL Cholesterol      >39 mg/dL    33 (L)   VLDL, calculated      5 - 40 mg/dL    25   LDL, calculated      0 - 99 mg/dL    67   Creatinine, urine      Not Estab. mg/dL  66.1     Microalbumin, urine      Not Estab. ug/mL  14.8     Microalbumin/Creat.  Ratio      0 - 29 mg/g creat  22     Hemoglobin A1c, (calculated) 4.8 - 5.6 % 6.7 (H)      Estimated average glucose      mg/dL 146          Assessment/Plan:     1. Type 2 diabetes mellitus with microalbuminuria, with long-term current use of insulin ([address]): his most recent Hgb A1c was 6.7% in 5/22 up from 6.3% in 11/21 down from 6.5% in 9/21 up from 6.4% in 4/21 down from 7.2% in 10/20 which is at goal of 7.5% or less but lower than I need it to be so decreased synjardy to help with cost and decrease the risk of dehydration and hypoglycemia and see if it will help with leg cramps at night. - cont lantus 16 units in the morning  - decrease synjardy XR 12.5/1000 mg to 1 tab in the morning (kept written at 2 tabs daily to help with cost)  - cont ozempic 1 mg 38 clicks weekly = 0.5 mg   - check bs twice daily  - foot exam done 11/21  - microalbumin 43 in 1/20 (off losartan 50 mg) but down to 21 in 10/20 and 18 in 9/21 but up to 33 in 11/21, down to 22 in 5/22  - eye exam 2021--will obtain report  - check Hgb A1c and bmp prior to next visit        2. Dyslipidemia: LDL 74 on crestor 5 mg daily in 11/20 and 70 in 9/21 and 83 in 11/21 and 67 in 5/22  - cont crestor 5 mg daily  - check lipids in 5/23        3. Essential hypertension: BP < 140/90 in 1/20 off losartan  - follow off meds for now      Patient Instructions   1) Stop the evening dose of synjardy and just take 1 tab in the morning. The prescription will still say 2 tabs daily but only take 1 tab in the morning to help with cost and decrease the change of low sugars during the day and dehydration in the heat. We'll see if this helps with the leg symptoms you have been experiencing at night. 2) Stay on lantus 16 units in the morning and ozempic 38 clicks every Friday. If your fasting sugars are staying over 130 2 or more times in a week, then increase the lantus back to 18 units to keep your sugars 100-130 in the morning. 3) BUN and creatinine are markers of kidney function.   Your values are normal.    4) ALT and AST are markers of liver function. Your values are normal.    5) Microalbumin/creatinine ratio is a marker of the amount of protein in your urine. Goal is less than 30. Your value is normal. This indicates that your kidneys are not being affected by your diabetes and/or blood pressure. 6) Your cholesterol has improved and is at goal.    7) Your weight is healthy for your height. Follow-up and Dispositions    · Return in about 6 months (around 11/23/2022).                Copy sent to:  Vianney Hand MD

## 2022-06-14 ENCOUNTER — TELEPHONE (OUTPATIENT)
Dept: ENDOCRINOLOGY | Age: 82
End: 2022-06-14

## 2022-06-14 RX ORDER — BLOOD SUGAR DIAGNOSTIC
STRIP MISCELLANEOUS
Qty: 200 STRIP | Refills: 3 | Status: SHIPPED | OUTPATIENT
Start: 2022-06-14

## 2022-06-14 NOTE — TELEPHONE ENCOUNTER
Pt called and LVM 6/14 @ 4:13pm. Pt stated his pharmacy sent a request for a refill of his test strips, but was also instructed to call the office to pass the message along and notify dr Monica Cummings of the refill request. Pharmacy is [facility] on [address] [place] Toure [phone]

## 2022-06-14 NOTE — TELEPHONE ENCOUNTER
Sent him the following message through [employer]:    I spoke with the pharmacist, Meseret Vegas, and she processed your refill for test strips and you will be receiving 200 strips for 90 days. Take care and hope you are well.

## 2022-08-02 RX ORDER — INSULIN GLARGINE 100 [IU]/ML
INJECTION, SOLUTION SUBCUTANEOUS
Qty: 30 ML | Refills: 3
Start: 2022-08-02

## 2022-10-10 ENCOUNTER — OFFICE VISIT (OUTPATIENT)
Dept: URGENT CARE | Age: 82
End: 2022-10-10
Payer: MEDICARE

## 2022-10-10 VITALS
DIASTOLIC BLOOD PRESSURE: 89 MMHG | SYSTOLIC BLOOD PRESSURE: 146 MMHG | RESPIRATION RATE: 17 BRPM | HEART RATE: 69 BPM | BODY MASS INDEX: 24.94 KG/M2 | WEIGHT: 164 LBS | TEMPERATURE: 98.4 F | OXYGEN SATURATION: 96 %

## 2022-10-10 DIAGNOSIS — K12.0 APHTHOUS ULCER: Primary | ICD-10-CM

## 2022-10-10 PROCEDURE — G8432 DEP SCR NOT DOC, RNG: HCPCS | Performed by: FAMILY MEDICINE

## 2022-10-10 PROCEDURE — 99203 OFFICE O/P NEW LOW 30 MIN: CPT | Performed by: FAMILY MEDICINE

## 2022-10-10 PROCEDURE — G8536 NO DOC ELDER MAL SCRN: HCPCS | Performed by: FAMILY MEDICINE

## 2022-10-10 PROCEDURE — G8753 SYS BP > OR = 140: HCPCS | Performed by: FAMILY MEDICINE

## 2022-10-10 PROCEDURE — G8427 DOCREV CUR MEDS BY ELIG CLIN: HCPCS | Performed by: FAMILY MEDICINE

## 2022-10-10 PROCEDURE — 1101F PT FALLS ASSESS-DOCD LE1/YR: CPT | Performed by: FAMILY MEDICINE

## 2022-10-10 PROCEDURE — G8420 CALC BMI NORM PARAMETERS: HCPCS | Performed by: FAMILY MEDICINE

## 2022-10-10 PROCEDURE — G8754 DIAS BP LESS 90: HCPCS | Performed by: FAMILY MEDICINE

## 2022-10-10 PROCEDURE — 1123F ACP DISCUSS/DSCN MKR DOCD: CPT | Performed by: FAMILY MEDICINE

## 2022-10-10 RX ORDER — TRIAMCINOLONE ACETONIDE 1 MG/G
PASTE DENTAL 2 TIMES DAILY
Qty: 5 G | Refills: 0 | Status: SHIPPED | OUTPATIENT
Start: 2022-10-10 | End: 2022-10-15

## 2022-10-10 RX ORDER — CHLORHEXIDINE GLUCONATE 1.2 MG/ML
15 RINSE ORAL 2 TIMES DAILY
Qty: 210 ML | Refills: 0 | Status: SHIPPED | OUTPATIENT
Start: 2022-10-10 | End: 2022-10-17

## 2022-10-10 NOTE — PROGRESS NOTES
Glenn Luz is a [de-identified] y.o. male who presents with mouth lesion on roof of mouth x 2 weeks; reports had COVID 6 weeks ago. Also reports dry mouth, raspy voice, slight cough. Denies ST, ear pain, fever, SOB. Reports eating/drinking well. States blood sugars well controlled. The history is provided by the patient. Past Medical History:   Diagnosis Date    Cancer ([address]     melanoma shoulder right,basal cell cancer nose    Diabetes (HCC)     GERD (gastroesophageal reflux disease)     HTN (hypertension)     Hyperlipemia     Ill-defined condition     insomnia    Sleep apnea     cpap        Past Surgical History:   Procedure Laterality Date    HAND/FINGER SURGERY UNLISTED      HX ORTHOPAEDIC      trigger finger left    HX OTHER SURGICAL      melonama removed 6 years ago    HX OTHER SURGICAL      basal skin cancer    OH TRABECULOPLASTY BY LASER SURGERY      UPPER GI ENDOSCOPY,BIOPSY  1/13/2017              Family History   Problem Relation Age of Onset    Cancer Mother     Diabetes Maternal Grandfather     Diabetes Sister     Kidney Disease Paternal Grandmother         Social History     Socioeconomic History    Marital status:      Spouse name: Not on file    Number of children: Not on file    Years of education: Not on file    Highest education level: Not on file   Occupational History    Not on file   Tobacco Use    Smoking status: Never    Smokeless tobacco: Never   Substance and Sexual Activity    Alcohol use:  Yes     Alcohol/week: 3.0 standard drinks     Types: 3 Glasses of wine per week     Comment: moderately    Drug use: No    Sexual activity: Not on file   Other Topics Concern    Not on file   Social History Narrative    Not on file     Social Determinants of Health     Financial Resource Strain: Not on file   Food Insecurity: Not on file   Transportation Needs: Not on file   Physical Activity: Not on file   Stress: Not on file   Social Connections: Not on file   Intimate Partner Violence: Not on file   Housing Stability: Not on file                ALLERGIES: Patient has no known allergies. Review of Systems   Constitutional:  Negative for activity change, appetite change, chills and fever. HENT:  Positive for mouth sores and voice change. Negative for congestion and sore throat. Respiratory:  Positive for cough. Negative for shortness of breath. Vitals:    10/10/22 1711   BP: (!) 146/89   Pulse: 69   Resp: 17   Temp: 98.4 °F (36.9 °C)   SpO2: 96%   Weight: 164 lb (74.4 kg)       Physical Exam  Vitals and nursing note reviewed. Constitutional:       General: He is not in acute distress. Appearance: He is well-developed. He is not diaphoretic. HENT:      Right Ear: Tympanic membrane and ear canal normal.      Left Ear: Tympanic membrane and ear canal normal.      Nose: Nose normal.      Mouth/Throat:      Mouth: Mucous membranes are moist. Oral lesions present. Pharynx: Oropharynx is clear. No oropharyngeal exudate or posterior oropharyngeal erythema. Cardiovascular:      Rate and Rhythm: Normal rate and regular rhythm. Pulmonary:      Effort: Pulmonary effort is normal. No respiratory distress. Breath sounds: Normal breath sounds. No stridor. No wheezing, rhonchi or rales. Lymphadenopathy:      Cervical: No cervical adenopathy. Neurological:      Mental Status: He is alert. Psychiatric:         Behavior: Behavior normal.         Thought Content: Thought content normal.         Judgment: Judgment normal.       [facility]    ICD-10-CM ICD-9-CM   1. Aphthous ulcer  K12.0 528.2       Orders Placed This Encounter    triamcinolone acetonide (KENALOG) 0.1 % dental paste     Sig: by Dental route two (2) times a day for 5 days. Dispense:  5 g     Refill:  0    chlorhexidine (PERIDEX) 0.12 % solution     Sig: 15 mL by Swish and Spit route two (2) times a day for 7 days. Dispense:  210 mL     Refill:  0        The patient is to follow up with PCP.      If signs and symptoms become worse the pt is to go to the ER.           Procedures

## 2022-11-08 DIAGNOSIS — R80.9 TYPE 2 DIABETES MELLITUS WITH MICROALBUMINURIA, WITH LONG-TERM CURRENT USE OF INSULIN (HCC): ICD-10-CM

## 2022-11-08 DIAGNOSIS — Z79.4 TYPE 2 DIABETES MELLITUS WITH MICROALBUMINURIA, WITH LONG-TERM CURRENT USE OF INSULIN (HCC): ICD-10-CM

## 2022-11-08 DIAGNOSIS — E11.29 TYPE 2 DIABETES MELLITUS WITH MICROALBUMINURIA, WITH LONG-TERM CURRENT USE OF INSULIN (HCC): ICD-10-CM

## 2022-11-08 DIAGNOSIS — E78.5 DYSLIPIDEMIA: ICD-10-CM

## 2022-11-08 DIAGNOSIS — I10 ESSENTIAL HYPERTENSION: ICD-10-CM

## 2022-11-22 LAB
BUN SERPL-MCNC: 19 MG/DL (ref 8–27)
BUN/CREAT SERPL: 15 (ref 10–24)
CALCIUM SERPL-MCNC: 9.7 MG/DL (ref 8.6–10.2)
CHLORIDE SERPL-SCNC: 106 MMOL/L (ref 96–106)
CO2 SERPL-SCNC: 22 MMOL/L (ref 20–29)
CREAT SERPL-MCNC: 1.26 MG/DL (ref 0.76–1.27)
EGFR: 58 ML/MIN/1.73
EST. AVERAGE GLUCOSE BLD GHB EST-MCNC: 140 MG/DL
GLUCOSE SERPL-MCNC: 115 MG/DL (ref 70–99)
HBA1C MFR BLD: 6.5 % (ref 4.8–5.6)
INTERPRETATION: NORMAL
POTASSIUM SERPL-SCNC: 4.6 MMOL/L (ref 3.5–5.2)
SODIUM SERPL-SCNC: 144 MMOL/L (ref 134–144)

## 2022-11-28 ENCOUNTER — OFFICE VISIT (OUTPATIENT)
Dept: ENDOCRINOLOGY | Age: 82
End: 2022-11-28
Payer: MEDICARE

## 2022-11-28 VITALS
DIASTOLIC BLOOD PRESSURE: 78 MMHG | HEART RATE: 70 BPM | HEIGHT: 68 IN | BODY MASS INDEX: 25.07 KG/M2 | WEIGHT: 165.4 LBS | SYSTOLIC BLOOD PRESSURE: 133 MMHG

## 2022-11-28 DIAGNOSIS — Z79.4 TYPE 2 DIABETES MELLITUS WITH MICROALBUMINURIA, WITH LONG-TERM CURRENT USE OF INSULIN (HCC): Primary | ICD-10-CM

## 2022-11-28 DIAGNOSIS — E11.29 TYPE 2 DIABETES MELLITUS WITH MICROALBUMINURIA, WITH LONG-TERM CURRENT USE OF INSULIN (HCC): Primary | ICD-10-CM

## 2022-11-28 DIAGNOSIS — E78.5 DYSLIPIDEMIA: ICD-10-CM

## 2022-11-28 DIAGNOSIS — R80.9 TYPE 2 DIABETES MELLITUS WITH MICROALBUMINURIA, WITH LONG-TERM CURRENT USE OF INSULIN (HCC): Primary | ICD-10-CM

## 2022-11-28 DIAGNOSIS — I10 ESSENTIAL HYPERTENSION: ICD-10-CM

## 2022-11-28 PROCEDURE — G8536 NO DOC ELDER MAL SCRN: HCPCS | Performed by: INTERNAL MEDICINE

## 2022-11-28 PROCEDURE — 3078F DIAST BP <80 MM HG: CPT | Performed by: INTERNAL MEDICINE

## 2022-11-28 PROCEDURE — G8752 SYS BP LESS 140: HCPCS | Performed by: INTERNAL MEDICINE

## 2022-11-28 PROCEDURE — 99214 OFFICE O/P EST MOD 30 MIN: CPT | Performed by: INTERNAL MEDICINE

## 2022-11-28 PROCEDURE — 3044F HG A1C LEVEL LT 7.0%: CPT | Performed by: INTERNAL MEDICINE

## 2022-11-28 PROCEDURE — G8754 DIAS BP LESS 90: HCPCS | Performed by: INTERNAL MEDICINE

## 2022-11-28 PROCEDURE — G8417 CALC BMI ABV UP PARAM F/U: HCPCS | Performed by: INTERNAL MEDICINE

## 2022-11-28 PROCEDURE — 1101F PT FALLS ASSESS-DOCD LE1/YR: CPT | Performed by: INTERNAL MEDICINE

## 2022-11-28 PROCEDURE — 1123F ACP DISCUSS/DSCN MKR DOCD: CPT | Performed by: INTERNAL MEDICINE

## 2022-11-28 PROCEDURE — G8432 DEP SCR NOT DOC, RNG: HCPCS | Performed by: INTERNAL MEDICINE

## 2022-11-28 PROCEDURE — G8427 DOCREV CUR MEDS BY ELIG CLIN: HCPCS | Performed by: INTERNAL MEDICINE

## 2022-11-28 PROCEDURE — 3074F SYST BP LT 130 MM HG: CPT | Performed by: INTERNAL MEDICINE

## 2022-11-28 RX ORDER — INSULIN GLARGINE 100 [IU]/ML
INJECTION, SOLUTION SUBCUTANEOUS
Qty: 30 ML | Refills: 3
Start: 2022-11-28

## 2022-11-28 RX ORDER — ROSUVASTATIN CALCIUM 5 MG/1
TABLET, COATED ORAL
Qty: 90 TABLET | Refills: 3
Start: 2022-11-28

## 2022-11-28 NOTE — PROGRESS NOTES
Chief Complaint   Patient presents with    Diabetes     PCP and pharmacy confirmed     Other     Eye exam scheduled 01/2023     History of Present Illness: Yon Hammonds (goes by Jayla Drew) is a [de-identified] y.o. male here for follow up of diabetes. Weight down 1 lbs since last visit in 5/22. Has been taking the lower dose of 1 tab of synjardy and this has helped with cramps at night but they can still happen at times and we agreed to try cutting back on the crestor to see if this helps. Fasting sugars are mostly in the  range but a few times a month can be in the 80s but not lower and rarely over 130. Had to increase his lantus up to 22 units as of the end of August.        Current Outpatient Medications   Medication Sig    insulin glargine (Lantus U-100 Insulin) 100 unit/mL injection INJECT 22 UNITS SUBCUTANEOUSLY EVERY MORNING--Dose change 11/28/22--updated med list--did not send prescription to the pharmacy    OneTouch Ultra Test strip USE AS DIRECTED TWICE A DAY E11.9    Synjardy XR 12.5-1,000 mg TBph Take 1 tab daily--replaces invokamet XR--Dose change 05/23/22--updated med list--did not send prescription to the pharmacy    semaglutide (Ozempic) 1 mg/dose (4 mg/3 mL) pnij 1 mg by SubCUTAneous route every seven (7) days. Dispense 3 pens = 9 ml for a 90 day supply    rosuvastatin (CRESTOR) 5 mg tablet TAKE 1 TABLET BY MOUTH EVERY DAY    cholecalciferol, vitamin D3, (VITAMIN D3 PO) Take 1,000 mcg by mouth daily. Insulin Syringe-Needle U-100 (BD INSULIN SYRINGE ULTRA-FINE) 1 mL 31 gauge x 5/16 syrg USING ONCE DAILY WITH LANTUS    Lancing Device misc Monitor blood sugar twice daily. Diagnosis code: E11.8    Blood-Glucose Meter (ONETOUCH ULTRA2) monitoring kit Monitor blood sugar twice daily. Diagnosis code: E11.9    lancets (ONE TOUCH DELICA) 33 gauge misc Monitor blood sugar twice daily. Diagnosis code: E11.9    acetaminophen (TYLENOL) 325 mg tablet Take  by mouth every four (4) hours as needed for Pain. No current facility-administered medications for this visit. No Known Allergies    Review of Systems: PER HPI    Physical Examination:  Blood pressure 133/78, pulse 70, height 5' 8\" (1.727 m), weight 165 lb 6.4 oz (75 kg). General: pleasant, no distress, good eye contact   Neck: no carotid bruits  Cardiovascular: regular, normal rate, nl s1 and s2, no m/r/g,   Respiratory: clear bilaterally  Integumentary: no edema,   Psychiatric: normal mood and affect    Diabetic foot exam:     Left Foot:   Visual Exam: callous - mild   Pulse DP: 2+ (normal)   Filament test: normal sensation    Vibratory sensation: diminished      Right Foot:   Visual Exam: callous - mild   Pulse DP: 2+ (normal)   Filament test: normal sensation    Vibratory sensation: diminished      Data Reviewed:   Component      Latest Ref Rng & Units 11/21/2022 11/21/2022           7:49 AM  7:49 AM   Glucose      70 - 99 mg/dL 115 (H)    BUN      8 - 27 mg/dL 19    Creatinine      0.76 - 1.27 mg/dL 1.26    eGFR      >59 mL/min/1.73 58 (L)    BUN/Creatinine ratio      10 - 24 15    Sodium      134 - 144 mmol/L 144    Potassium      3.5 - 5.2 mmol/L 4.6    Chloride      96 - 106 mmol/L 106    CO2      20 - 29 mmol/L 22    Calcium      8.6 - 10.2 mg/dL 9.7    Hemoglobin A1c, (calculated)      4.8 - 5.6 %  6.5 (H)   Estimated average glucose      mg/dL  140       Assessment/Plan:     1.  Type 2 diabetes mellitus with microalbuminuria, with long-term current use of insulin ([facility]): his most recent Hgb A1c was 6.5% in 11/22 down from 6.7% in 5/22 up from 6.3% in 11/21 down from 6.5% in 9/21 up from 6.4% in 4/21 down from 7.2% in 10/20 which is at goal of 7.5-8% or less and not having any hypoglycemia so no changes neeeded  - cont lantus 22 units in the morning  - cont synjardy XR 12.5/1000 mg 1 tab in the morning (kept written at 2 tabs daily to help with cost)  - cont ozempic 1 mg 38 clicks weekly = 0.5 mg   - check bs twice daily  - foot exam done 11/22  - microalbumin 43 in 1/20 (off losartan 50 mg) but down to 21 in 10/20 and 18 in 9/21 but up to 33 in 11/21, down to 22 in 5/22  - eye exam 2021--will obtain report  - check Hgb A1c and cmp and microalbumin prior to next visit        2. Dyslipidemia: LDL 74 on crestor 5 mg daily in 11/20 and 70 in 9/21 and 83 in 11/21 and 67 in 5/22. Having more cramps so decreased to 4x/week  - decrease crestor 5 mg to 4x/week  - check lipids prior to next visit        3. Essential hypertension: BP < 140/90 in 1/20 off losartan  - follow off meds for now      Patient Instructions   1) Try decreasing the rosuvastatin (crestor) to 1/2 tab everyday or 1 whole tab 4 days a week on Mon, Wed, Fri, and Sat to see if this helps with leg pains at night. 2) Your Hemoglobin A1c (3 month test of blood sugar) is still very good so I recommend staying on the 22 units of lantus. If you have 2 or more readings under 90 in a week, then decrease your insulin by 2 units every week or if you have 3 or or more readings over 130 in a week not explained by food, then go up by 2 units. 3) BUN and creatinine are markers of kidney function. Your values are normal.    4) Your blood pressure was controlled. Follow-up and Dispositions    Return in about 6 months (around 5/28/2023).                Copy sent to:  Santos Dukes MD

## 2022-11-28 NOTE — PATIENT INSTRUCTIONS
1) Try decreasing the rosuvastatin (crestor) to 1/2 tab everyday or 1 whole tab 4 days a week on Mon, Wed, Fri, and Sat to see if this helps with leg pains at night. 2) Your Hemoglobin A1c (3 month test of blood sugar) is still very good so I recommend staying on the 22 units of lantus. If you have 2 or more readings under 90 in a week, then decrease your insulin by 2 units every week or if you have 3 or or more readings over 130 in a week not explained by food, then go up by 2 units. 3) BUN and creatinine are markers of kidney function. Your values are normal.    4) Your blood pressure was controlled.

## 2022-12-24 RX ORDER — ROSUVASTATIN CALCIUM 5 MG/1
TABLET, COATED ORAL
Qty: 90 TABLET | Refills: 3 | Status: SHIPPED | OUTPATIENT
Start: 2022-12-24

## 2023-04-17 RX ORDER — EMPAGLIFLOZIN, METFORMIN HYDROCHLORIDE 12.5; 1 MG/1; MG/1
TABLET, EXTENDED RELEASE ORAL
Qty: 180 EACH | Refills: 3 | Status: SHIPPED | OUTPATIENT
Start: 2023-04-17

## 2023-04-22 DIAGNOSIS — Z79.4 TYPE 2 DIABETES MELLITUS WITH MICROALBUMINURIA, WITH LONG-TERM CURRENT USE OF INSULIN (HCC): Primary | ICD-10-CM

## 2023-04-22 DIAGNOSIS — E11.29 TYPE 2 DIABETES MELLITUS WITH MICROALBUMINURIA, WITH LONG-TERM CURRENT USE OF INSULIN (HCC): Primary | ICD-10-CM

## 2023-04-22 DIAGNOSIS — R80.9 TYPE 2 DIABETES MELLITUS WITH MICROALBUMINURIA, WITH LONG-TERM CURRENT USE OF INSULIN (HCC): Primary | ICD-10-CM

## 2023-04-22 DIAGNOSIS — E78.5 DYSLIPIDEMIA: ICD-10-CM

## 2023-04-23 DIAGNOSIS — R80.9 TYPE 2 DIABETES MELLITUS WITH MICROALBUMINURIA, WITH LONG-TERM CURRENT USE OF INSULIN (HCC): Primary | ICD-10-CM

## 2023-04-23 DIAGNOSIS — E11.29 TYPE 2 DIABETES MELLITUS WITH MICROALBUMINURIA, WITH LONG-TERM CURRENT USE OF INSULIN (HCC): Primary | ICD-10-CM

## 2023-04-23 DIAGNOSIS — E78.5 DYSLIPIDEMIA: ICD-10-CM

## 2023-04-23 DIAGNOSIS — Z79.4 TYPE 2 DIABETES MELLITUS WITH MICROALBUMINURIA, WITH LONG-TERM CURRENT USE OF INSULIN (HCC): Primary | ICD-10-CM

## 2023-04-24 DIAGNOSIS — E11.29 TYPE 2 DIABETES MELLITUS WITH MICROALBUMINURIA, WITH LONG-TERM CURRENT USE OF INSULIN (HCC): Primary | ICD-10-CM

## 2023-04-24 DIAGNOSIS — R80.9 TYPE 2 DIABETES MELLITUS WITH MICROALBUMINURIA, WITH LONG-TERM CURRENT USE OF INSULIN (HCC): Primary | ICD-10-CM

## 2023-04-24 DIAGNOSIS — Z79.4 TYPE 2 DIABETES MELLITUS WITH MICROALBUMINURIA, WITH LONG-TERM CURRENT USE OF INSULIN (HCC): Primary | ICD-10-CM

## 2023-04-24 DIAGNOSIS — E78.5 DYSLIPIDEMIA: ICD-10-CM

## 2023-05-10 DIAGNOSIS — E78.5 DYSLIPIDEMIA: ICD-10-CM

## 2023-05-10 DIAGNOSIS — R80.9 TYPE 2 DIABETES MELLITUS WITH MICROALBUMINURIA, WITH LONG-TERM CURRENT USE OF INSULIN (HCC): ICD-10-CM

## 2023-05-10 DIAGNOSIS — E11.29 TYPE 2 DIABETES MELLITUS WITH MICROALBUMINURIA, WITH LONG-TERM CURRENT USE OF INSULIN (HCC): ICD-10-CM

## 2023-05-10 DIAGNOSIS — I10 ESSENTIAL HYPERTENSION: ICD-10-CM

## 2023-05-10 DIAGNOSIS — Z79.4 TYPE 2 DIABETES MELLITUS WITH MICROALBUMINURIA, WITH LONG-TERM CURRENT USE OF INSULIN (HCC): ICD-10-CM

## 2023-05-18 LAB
EST. AVERAGE GLUCOSE BLD GHB EST-MCNC: 140 MG/DL
HBA1C MFR BLD: 6.5 % (ref 4.8–5.6)

## 2023-05-19 ENCOUNTER — OFFICE VISIT (OUTPATIENT)
Age: 83
End: 2023-05-19

## 2023-05-19 VITALS
WEIGHT: 159 LBS | HEIGHT: 70 IN | HEART RATE: 66 BPM | TEMPERATURE: 97.5 F | DIASTOLIC BLOOD PRESSURE: 100 MMHG | SYSTOLIC BLOOD PRESSURE: 173 MMHG | OXYGEN SATURATION: 99 % | BODY MASS INDEX: 22.76 KG/M2 | RESPIRATION RATE: 17 BRPM

## 2023-05-19 DIAGNOSIS — S46.812A STRAIN OF DELTOID MUSCLE, LEFT, INITIAL ENCOUNTER: ICD-10-CM

## 2023-05-19 DIAGNOSIS — I49.3 PVC (PREMATURE VENTRICULAR CONTRACTION): ICD-10-CM

## 2023-05-19 DIAGNOSIS — S56.212A STRAIN OF OTHER FLEXOR MUSCLE, FASCIA AND TENDON AT FOREARM LEVEL, LEFT ARM, INITIAL ENCOUNTER: Primary | ICD-10-CM

## 2023-05-19 LAB
ALBUMIN SERPL-MCNC: 4.4 G/DL (ref 3.7–4.7)
ALBUMIN/CREAT UR: 32 MG/G CREAT (ref 0–29)
ALBUMIN/GLOB SERPL: 1.6 {RATIO} (ref 1.2–2.2)
ALP SERPL-CCNC: 65 IU/L (ref 44–121)
ALT SERPL-CCNC: 30 IU/L (ref 0–44)
AST SERPL-CCNC: 28 IU/L (ref 0–40)
BILIRUB SERPL-MCNC: 0.5 MG/DL (ref 0–1.2)
BUN SERPL-MCNC: 16 MG/DL (ref 8–27)
BUN/CREAT SERPL: 14 (ref 10–24)
CALCIUM SERPL-MCNC: 9.9 MG/DL (ref 8.6–10.2)
CHLORIDE SERPL-SCNC: 104 MMOL/L (ref 96–106)
CHOLEST SERPL-MCNC: 161 MG/DL (ref 100–199)
CO2 SERPL-SCNC: 25 MMOL/L (ref 20–29)
CREAT SERPL-MCNC: 1.18 MG/DL (ref 0.76–1.27)
CREAT UR-MCNC: 125.4 MG/DL
EGFRCR SERPLBLD CKD-EPI 2021: 62 ML/MIN/1.73
GLOBULIN SER CALC-MCNC: 2.8 G/DL (ref 1.5–4.5)
GLUCOSE SERPL-MCNC: 116 MG/DL (ref 70–99)
HDLC SERPL-MCNC: 49 MG/DL
LDLC SERPL CALC-MCNC: 98 MG/DL (ref 0–99)
MICROALBUMIN UR-MCNC: 40.2 UG/ML
POTASSIUM SERPL-SCNC: 4.4 MMOL/L (ref 3.5–5.2)
PROT SERPL-MCNC: 7.2 G/DL (ref 6–8.5)
SODIUM SERPL-SCNC: 143 MMOL/L (ref 134–144)
TRIGL SERPL-MCNC: 72 MG/DL (ref 0–149)
VLDLC SERPL CALC-MCNC: 14 MG/DL (ref 5–40)

## 2023-05-19 RX ORDER — CYCLOBENZAPRINE HCL 5 MG
5 TABLET ORAL NIGHTLY PRN
Qty: 12 TABLET | Refills: 0 | Status: SHIPPED | OUTPATIENT
Start: 2023-05-19 | End: 2023-05-29

## 2023-05-19 RX ORDER — EMPAGLIFLOZIN AND METFORMIN HYDROCHLORIDE 12.5; 1 MG/1; MG/1
TABLET ORAL
COMMUNITY

## 2023-05-19 RX ORDER — METHYLPREDNISOLONE 4 MG/1
TABLET ORAL
Qty: 1 KIT | Refills: 0 | Status: SHIPPED | OUTPATIENT
Start: 2023-05-19 | End: 2023-05-25

## 2023-05-19 RX ORDER — FAMOTIDINE 40 MG/1
40 TABLET, FILM COATED ORAL DAILY
COMMUNITY
Start: 2023-04-10

## 2023-05-19 ASSESSMENT — ENCOUNTER SYMPTOMS
RESPIRATORY NEGATIVE: 1
EYES NEGATIVE: 1
GASTROINTESTINAL NEGATIVE: 1

## 2023-05-19 NOTE — PROGRESS NOTES
Razia Rodney ( 1940) is a [de-identified] y.o. male, New Patient patient, here for evaluation of the following chief complaint(s):  Arm Pain (Patient c/o left arm and shoulder pain no known injury for 1.5 weeks)         ASSESSMENT/PLAN:  Hi Herron was seen today for arm pain. Diagnoses and all orders for this visit:    Strain of other flexor muscle, fascia and tendon at forearm level, left arm, initial encounter  -     REH[facility]IL[facility]ATION [facility] - Physical Therapy at [facility]    Strain of deltoid muscle, left, initial encounter  -     REH[facility]ILITATION [facility] - Physical Therapy at [facility]    PVC (premature ventricular contraction)           No follow-ups on file. History provided by:  Patient   used: No    Arm Pain   The incident occurred more than 1 week ago. The incident occurred at home. Injury mechanism: working in the yard. The pain is present in the upper left arm and left forearm. The quality of the pain is described as aching. The pain is moderate. The pain has been Fluctuating since the incident. Pertinent negatives include no chest pain, numbness or tingling. The symptoms are aggravated by movement and lifting. He has tried nothing for the symptoms. Review of Systems   Constitutional: Negative. HENT: Negative. Eyes: Negative. Respiratory: Negative. Cardiovascular: Negative. Negative for chest pain, palpitations and leg swelling. Gastrointestinal: Negative. Genitourinary: Negative. Musculoskeletal:         Left arm and shoulder pain. Skin: Negative. Neurological:  Negative for tingling and numbness.        Subjective:   Pt is a [de-identified] y.o. male     Past Medical History:   Diagnosis Date    Cancer ([facility]     melanoma shoulder right,basal cell cancer nose    Diabetes ([facility]     GERD (gastroesophageal reflux disease)     HTN (hypertension)     Hyperlipemia     Ill-defined condition     insomnia    Sleep apnea     cpap       Past Surgical History:   Procedure Laterality Date

## 2023-05-25 RX ORDER — INSULIN GLARGINE 100 [IU]/ML
INJECTION, SOLUTION SUBCUTANEOUS
Qty: 30 ML | Refills: 3 | Status: SHIPPED | OUTPATIENT
Start: 2023-05-25 | End: 2023-05-26

## 2023-05-26 ENCOUNTER — OFFICE VISIT (OUTPATIENT)
Age: 83
End: 2023-05-26
Payer: MEDICARE

## 2023-05-26 VITALS
HEART RATE: 71 BPM | DIASTOLIC BLOOD PRESSURE: 80 MMHG | WEIGHT: 159.6 LBS | BODY MASS INDEX: 22.85 KG/M2 | SYSTOLIC BLOOD PRESSURE: 126 MMHG | HEIGHT: 70 IN

## 2023-05-26 DIAGNOSIS — E11.21 TYPE 2 DIABETES WITH NEPHROPATHY (HCC): Primary | ICD-10-CM

## 2023-05-26 DIAGNOSIS — Z79.4 LONG TERM (CURRENT) USE OF INSULIN (HCC): ICD-10-CM

## 2023-05-26 DIAGNOSIS — E78.5 DYSLIPIDEMIA: ICD-10-CM

## 2023-05-26 DIAGNOSIS — I10 ESSENTIAL (PRIMARY) HYPERTENSION: ICD-10-CM

## 2023-05-26 PROCEDURE — 3074F SYST BP LT 130 MM HG: CPT | Performed by: INTERNAL MEDICINE

## 2023-05-26 PROCEDURE — 1036F TOBACCO NON-USER: CPT | Performed by: INTERNAL MEDICINE

## 2023-05-26 PROCEDURE — 99214 OFFICE O/P EST MOD 30 MIN: CPT | Performed by: INTERNAL MEDICINE

## 2023-05-26 PROCEDURE — 1123F ACP DISCUSS/DSCN MKR DOCD: CPT | Performed by: INTERNAL MEDICINE

## 2023-05-26 PROCEDURE — G8420 CALC BMI NORM PARAMETERS: HCPCS | Performed by: INTERNAL MEDICINE

## 2023-05-26 PROCEDURE — G8427 DOCREV CUR MEDS BY ELIG CLIN: HCPCS | Performed by: INTERNAL MEDICINE

## 2023-05-26 PROCEDURE — 3044F HG A1C LEVEL LT 7.0%: CPT | Performed by: INTERNAL MEDICINE

## 2023-05-26 PROCEDURE — 3079F DIAST BP 80-89 MM HG: CPT | Performed by: INTERNAL MEDICINE

## 2023-05-26 RX ORDER — INSULIN GLARGINE 100 [IU]/ML
INJECTION, SOLUTION SUBCUTANEOUS
Qty: 30 ML | Refills: 3
Start: 2023-05-26

## 2023-05-26 RX ORDER — DULOXETIN HYDROCHLORIDE 20 MG/1
CAPSULE, DELAYED RELEASE ORAL DAILY
COMMUNITY
Start: 2023-05-22

## 2023-05-26 NOTE — PROGRESS NOTES
Chief Complaint   Patient presents with    Diabetes     PCP and pharmacy confirmed      History of Present Illness: Daryl Lua is a [de-identified] y.o. male here for follow up of diabetes. Weight down 6 lbs since last visit in 11/22. Was just seen at the urgent clinic last week for some pain in his left arm and was given flexeril and a medrol pack but the pharmacy never filled the flexeril so he just took the steroids and his pain has improved over the past week. Sugars only went to the 150s on the steroids. Has been taking 20 units of lantus in the morning and 1 tab of synjardy and 0.5 mg of ozempic and fasting sugars are in the 110s and all under 130. After dinner are all under 160. Has been taking crestor 4x/week and this has helped with leg cramps at night. His granddaughter is graduating high school and will be playing in the band at [employer]. His son is giving his last [de-identified] at a [employer] in [address] [facility] before moving to [place]      Current Outpatient Medications   Medication Sig    DULoxetine (CYMBALTA) 20 MG extended release capsule Take by mouth daily    insulin glargine (LANTUS) 100 UNIT/ML injection vial Inject 20 units in the AM--Dose change 05/26/23--updated med list--did not send prescription to the pharmacy    famotidine (PEPCID) 40 MG tablet Take 1 tablet by mouth daily    Lancet Devices (LANCING DEVICE) MISC Monitor blood sugar twice daily. Diagnosis code: E11.8    acetaminophen (TYLENOL) 325 MG tablet Take by mouth every 4 hours as needed    Empagliflozin-metFORMIN HCl ER (SYNJARDY XR) 12.5-1000 MG TB24 Take 1 tab daily--written as 2 tabs daily to help with cost    rosuvastatin (CRESTOR) 5 MG tablet Take 1 tab 4x/week    Semaglutide, 1 MG/DOSE, (OZEMPIC, 1 MG/DOSE,) 4 MG/3ML SOPN Inject 1 mg into the skin every 7 days     No current facility-administered medications for this visit.      No Known Allergies    Review of Systems: PER HPI    Physical Examination:  Blood

## 2023-05-26 NOTE — PATIENT INSTRUCTIONS
1) Microalbumin/creatinine ratio is a marker of the amount of protein in your urine. Goal is less than 30. Your value is barely abnormal at 32 so we'll repeat next time to see if you should go back on losartan for blood pressure and kidney protection. 2) Your Hemoglobin A1c (3 month test of blood sugar) is still excellent at 6.5%. Keep up the good work. 3) Your BUN and creatinine are markers of kidney function. Your values are normal.    4) ALT and AST are markers of liver function. Your values are normal.    5) Your blood pressure is controlled. 6) Your cholesterol was higher but still at goal so keep taking crestor 4x/week.

## 2023-06-02 ENCOUNTER — HOSPITAL ENCOUNTER (OUTPATIENT)
Facility: HOSPITAL | Age: 83
End: 2023-06-02
Payer: MEDICARE

## 2023-06-02 DIAGNOSIS — M25.812 ENLARGEMENT OF STERNOCLAVICULAR JOINT, LEFT: ICD-10-CM

## 2023-06-02 PROCEDURE — 73030 X-RAY EXAM OF SHOULDER: CPT

## 2023-06-19 RX ORDER — SEMAGLUTIDE 1.34 MG/ML
INJECTION, SOLUTION SUBCUTANEOUS
Qty: 9 ML | Refills: 3 | Status: SHIPPED | OUTPATIENT
Start: 2023-06-19

## 2023-06-19 RX ORDER — INSULIN GLARGINE 100 [IU]/ML
INJECTION, SOLUTION SUBCUTANEOUS
Qty: 30 ML | Refills: 3 | COMMUNITY
Start: 2023-06-19

## 2023-08-14 RX ORDER — BLOOD SUGAR DIAGNOSTIC
STRIP MISCELLANEOUS
Qty: 200 STRIP | Refills: 3 | Status: SHIPPED | OUTPATIENT
Start: 2023-08-14

## 2023-11-14 DIAGNOSIS — E11.21 TYPE 2 DIABETES WITH NEPHROPATHY (HCC): ICD-10-CM

## 2023-11-14 DIAGNOSIS — I10 ESSENTIAL (PRIMARY) HYPERTENSION: ICD-10-CM

## 2023-11-14 DIAGNOSIS — E78.5 DYSLIPIDEMIA: ICD-10-CM

## 2023-11-16 LAB — HBA1C MFR BLD: 6.1 % (ref 4.8–5.6)

## 2023-11-17 LAB
ALBUMIN SERPL-MCNC: 4.8 G/DL (ref 3.7–4.7)
ALBUMIN/CREAT UR: 64 MG/G CREAT (ref 0–29)
ALBUMIN/GLOB SERPL: 2.1 {RATIO} (ref 1.2–2.2)
ALP SERPL-CCNC: 75 IU/L (ref 44–121)
ALT SERPL-CCNC: 16 IU/L (ref 0–44)
AST SERPL-CCNC: 28 IU/L (ref 0–40)
BILIRUB SERPL-MCNC: 0.5 MG/DL (ref 0–1.2)
BUN SERPL-MCNC: 18 MG/DL (ref 8–27)
BUN/CREAT SERPL: 16 (ref 10–24)
CALCIUM SERPL-MCNC: 9.9 MG/DL (ref 8.6–10.2)
CHLORIDE SERPL-SCNC: 100 MMOL/L (ref 96–106)
CHOLEST SERPL-MCNC: 153 MG/DL (ref 100–199)
CO2 SERPL-SCNC: 24 MMOL/L (ref 20–29)
CREAT SERPL-MCNC: 1.12 MG/DL (ref 0.76–1.27)
CREAT UR-MCNC: 70.6 MG/DL
EGFRCR SERPLBLD CKD-EPI 2021: 66 ML/MIN/1.73
GLOBULIN SER CALC-MCNC: 2.3 G/DL (ref 1.5–4.5)
GLUCOSE SERPL-MCNC: 110 MG/DL (ref 70–99)
HDLC SERPL-MCNC: 46 MG/DL
IMP & REVIEW OF LAB RESULTS: NORMAL
LDLC SERPL CALC-MCNC: 96 MG/DL (ref 0–99)
MICROALBUMIN UR-MCNC: 45 UG/ML
POTASSIUM SERPL-SCNC: 4.2 MMOL/L (ref 3.5–5.2)
PROT SERPL-MCNC: 7.1 G/DL (ref 6–8.5)
SODIUM SERPL-SCNC: 139 MMOL/L (ref 134–144)
TRIGL SERPL-MCNC: 55 MG/DL (ref 0–149)
VLDLC SERPL CALC-MCNC: 11 MG/DL (ref 5–40)

## 2023-11-28 ENCOUNTER — OFFICE VISIT (OUTPATIENT)
Age: 83
End: 2023-11-28
Payer: MEDICARE

## 2023-11-28 VITALS
WEIGHT: 154.6 LBS | SYSTOLIC BLOOD PRESSURE: 134 MMHG | HEART RATE: 61 BPM | HEIGHT: 70 IN | BODY MASS INDEX: 22.13 KG/M2 | DIASTOLIC BLOOD PRESSURE: 82 MMHG

## 2023-11-28 DIAGNOSIS — E78.5 DYSLIPIDEMIA: ICD-10-CM

## 2023-11-28 DIAGNOSIS — Z79.4 LONG TERM (CURRENT) USE OF INSULIN (HCC): ICD-10-CM

## 2023-11-28 DIAGNOSIS — E11.21 TYPE 2 DIABETES WITH NEPHROPATHY (HCC): Primary | ICD-10-CM

## 2023-11-28 DIAGNOSIS — I10 ESSENTIAL (PRIMARY) HYPERTENSION: ICD-10-CM

## 2023-11-28 PROCEDURE — 3075F SYST BP GE 130 - 139MM HG: CPT | Performed by: INTERNAL MEDICINE

## 2023-11-28 PROCEDURE — G8427 DOCREV CUR MEDS BY ELIG CLIN: HCPCS | Performed by: INTERNAL MEDICINE

## 2023-11-28 PROCEDURE — G8484 FLU IMMUNIZE NO ADMIN: HCPCS | Performed by: INTERNAL MEDICINE

## 2023-11-28 PROCEDURE — 1123F ACP DISCUSS/DSCN MKR DOCD: CPT | Performed by: INTERNAL MEDICINE

## 2023-11-28 PROCEDURE — 3079F DIAST BP 80-89 MM HG: CPT | Performed by: INTERNAL MEDICINE

## 2023-11-28 PROCEDURE — 1036F TOBACCO NON-USER: CPT | Performed by: INTERNAL MEDICINE

## 2023-11-28 PROCEDURE — G8420 CALC BMI NORM PARAMETERS: HCPCS | Performed by: INTERNAL MEDICINE

## 2023-11-28 PROCEDURE — 3044F HG A1C LEVEL LT 7.0%: CPT | Performed by: INTERNAL MEDICINE

## 2023-11-28 PROCEDURE — 99214 OFFICE O/P EST MOD 30 MIN: CPT | Performed by: INTERNAL MEDICINE

## 2023-11-28 RX ORDER — LOSARTAN POTASSIUM 25 MG/1
TABLET ORAL
Qty: 90 TABLET | Refills: 3 | Status: SHIPPED | OUTPATIENT
Start: 2023-11-28

## 2023-11-28 NOTE — PATIENT INSTRUCTIONS
1) Your Hemoglobin A1c (3 month test of blood sugar) is 6.1% which is excellent but lower than I need it to be as goal is under 7.5-8%. Try stopping the lantus but stay on the ozempic and synjardy. 2) Check blood sugars once daily either fasting (goal is ) or 2 hours after a meal (goal is less than 180). Alternate one meal a day to check (example: one day check after breakfast, one day after lunch, one day after dinner). Write down what you ate if your blood sugar is more than 180 so you can know to cut back or cut out this food from your diet. If you are staying over 140 in the morning or over 180 after you eat over the next 2 weeks, then I would just restart 8 units of lantus instead of 12. 3) Microalbumin/creatinine ratio is a marker of the amount of protein in your urine. Goal is less than 30. Your value is higher at 64 up from 32. This indicates that your kidneys are being very slightly affected by your diabetes and/or blood pressure so we'll restart a low dose of losartan 25 mg 1 tab at bedtime to protect your kidneys. This will be ready for  at the pharmacy today. 4) BUN and creatinine are markers of kidney function. Your values are normal.    5) ALT and AST are markers of liver function. Your values are normal.    6) Your cholesterol is at goal.      7) Go to the [facility] lab next to my office in [facility] 3rd floor [address] for your labs next time.

## 2023-11-28 NOTE — PROGRESS NOTES
lb 9.6 oz). General: pleasant, no distress, good eye contact   Neck: no carotid bruits  Cardiovascular: regular, normal rate, nl s1 and s2, no m/r/g,   Respiratory: clear bilaterally  Integumentary: no edema,   Psychiatric: normal mood and affect    Diabetic foot exam:   Left Foot:   Visual Exam: callous- mild   Pulse DP: 2+ (normal)   Filament test: reduced sensation   Vibratory Sensation: diminished  Right Foot:   Visual Exam: callous- mild   Pulse DP: 2+ (normal)   Filament test: reduced sensation   Vibratory Sensation: diminished      Data Reviewed:   Component      Latest Ref Rng 11/16/2023   Glucose, Random      70 - 99 mg/dL 110 (H)    BUN,BUNPL      8 - 27 mg/dL 18    Creatinine      0.76 - 1.27 mg/dL 1.12    Est, Glomerular Filtration Rate      >59 mL/min/1.73 66    BUN/Creatinine Ratio      10 - 24  16    Sodium      134 - 144 mmol/L 139    Potassium      3.5 - 5.2 mmol/L 4.2    Chloride      96 - 106 mmol/L 100    CO2      20 - 29 mmol/L 24    CALCIUM, SERUM, 053256      8.6 - 10.2 mg/dL 9.9    Total Protein      6.0 - 8.5 g/dL 7.1    Albumin      3.7 - 4.7 g/dL 4.8 (H)    Globulin, Total      1.5 - 4.5 g/dL 2.3    Albumin/Globulin Ratio      1.2 - 2.2  2.1    BILIRUBIN TOTAL      0.0 - 1.2 mg/dL 0.5    Alk Phos      44 - 121 IU/L 75    AST      0 - 40 IU/L 28    ALT      0 - 44 IU/L 16    Cholesterol, Total      100 - 199 mg/dL 153    Triglycerides      0 - 149 mg/dL 55    HDL Cholesterol      >39 mg/dL 46    VLDL Cholesterol Calculated      5 - 40 mg/dL 11    LDL Calculated      0 - 99 mg/dL 96    Creatinine, Ur      Not Estab. mg/dL 70.6    Albumin, U      Not Estab. ug/mL 45.0    Microalb/Creat Ratio POC      0 - 29 mg/g creat 64 (H)    Hemoglobin A1C      4.8 - 5.6 % 6.1 (H)        Assessment/Plan:     1.  Type 2 diabetes mellitus with microalbuminuria, with long-term current use of insulin ([address]): his most recent Hgb A1c was 6.1% in 11/23 down from 6.5% in 5/23 stable from 11/22 down from 6.7% in

## 2023-11-30 ENCOUNTER — TELEPHONE (OUTPATIENT)
Age: 83
End: 2023-11-30

## 2023-11-30 RX ORDER — PRAMIPEXOLE DIHYDROCHLORIDE 0.12 MG/1
0.12 TABLET ORAL DAILY PRN
Qty: 90 TABLET | Refills: 3 | COMMUNITY
Start: 2023-11-30

## 2023-11-30 NOTE — TELEPHONE ENCOUNTER
Pt LVM stating he has two medications that he would like Dr Uche Warner to add to his medication list.   Pt states the meds are duloxetine 20 mg daily and pramipexole 0.125 mg PRN

## 2023-12-08 RX ORDER — SEMAGLUTIDE 0.68 MG/ML
INJECTION, SOLUTION SUBCUTANEOUS
Qty: 3 ML | Refills: 0 | Status: SHIPPED | COMMUNITY
Start: 2023-12-08

## 2024-04-16 RX ORDER — ROSUVASTATIN CALCIUM 5 MG/1
TABLET, COATED ORAL
Qty: 50 TABLET | Refills: 3 | Status: SHIPPED | OUTPATIENT
Start: 2024-04-16

## 2024-05-22 DIAGNOSIS — I10 ESSENTIAL (PRIMARY) HYPERTENSION: ICD-10-CM

## 2024-05-22 DIAGNOSIS — E78.5 DYSLIPIDEMIA: ICD-10-CM

## 2024-05-22 DIAGNOSIS — Z79.4 LONG TERM (CURRENT) USE OF INSULIN (HCC): ICD-10-CM

## 2024-05-22 DIAGNOSIS — E11.21 TYPE 2 DIABETES WITH NEPHROPATHY (HCC): ICD-10-CM

## 2024-05-22 LAB
ALBUMIN SERPL-MCNC: 4 G/DL (ref 3.5–5)
ALBUMIN/GLOB SERPL: 1.2 (ref 1.1–2.2)
ALP SERPL-CCNC: 60 U/L (ref 45–117)
ALT SERPL-CCNC: 20 U/L (ref 12–78)
ANION GAP SERPL CALC-SCNC: 2 MMOL/L (ref 5–15)
AST SERPL-CCNC: 22 U/L (ref 15–37)
BILIRUB SERPL-MCNC: 0.7 MG/DL (ref 0.2–1)
BUN SERPL-MCNC: 21 MG/DL (ref 6–20)
BUN/CREAT SERPL: 17 (ref 12–20)
CALCIUM SERPL-MCNC: 9.7 MG/DL (ref 8.5–10.1)
CHLORIDE SERPL-SCNC: 107 MMOL/L (ref 97–108)
CHOLEST SERPL-MCNC: 133 MG/DL
CO2 SERPL-SCNC: 28 MMOL/L (ref 21–32)
CREAT SERPL-MCNC: 1.24 MG/DL (ref 0.7–1.3)
CREAT UR-MCNC: 92.4 MG/DL
EST. AVERAGE GLUCOSE BLD GHB EST-MCNC: 131 MG/DL
GLOBULIN SER CALC-MCNC: 3.3 G/DL (ref 2–4)
GLUCOSE SERPL-MCNC: 135 MG/DL (ref 65–100)
HBA1C MFR BLD: 6.2 % (ref 4–5.6)
HDLC SERPL-MCNC: 50 MG/DL
HDLC SERPL: 2.7 (ref 0–5)
LDLC SERPL CALC-MCNC: 72.2 MG/DL (ref 0–100)
MICROALBUMIN UR-MCNC: 1.85 MG/DL
MICROALBUMIN/CREAT UR-RTO: 20 MG/G (ref 0–30)
POTASSIUM SERPL-SCNC: 4.8 MMOL/L (ref 3.5–5.1)
PROT SERPL-MCNC: 7.3 G/DL (ref 6.4–8.2)
SODIUM SERPL-SCNC: 137 MMOL/L (ref 136–145)
TRIGL SERPL-MCNC: 54 MG/DL
VLDLC SERPL CALC-MCNC: 10.8 MG/DL

## 2024-05-28 ENCOUNTER — OFFICE VISIT (OUTPATIENT)
Age: 84
End: 2024-05-28
Payer: MEDICARE

## 2024-05-28 VITALS
HEART RATE: 69 BPM | HEIGHT: 70 IN | BODY MASS INDEX: 21.96 KG/M2 | SYSTOLIC BLOOD PRESSURE: 133 MMHG | WEIGHT: 153.4 LBS | DIASTOLIC BLOOD PRESSURE: 78 MMHG

## 2024-05-28 DIAGNOSIS — E11.21 TYPE 2 DIABETES WITH NEPHROPATHY (HCC): Primary | ICD-10-CM

## 2024-05-28 DIAGNOSIS — Z79.4 LONG TERM (CURRENT) USE OF INSULIN (HCC): ICD-10-CM

## 2024-05-28 DIAGNOSIS — I10 ESSENTIAL (PRIMARY) HYPERTENSION: ICD-10-CM

## 2024-05-28 DIAGNOSIS — E78.5 DYSLIPIDEMIA: ICD-10-CM

## 2024-05-28 PROCEDURE — 3078F DIAST BP <80 MM HG: CPT | Performed by: INTERNAL MEDICINE

## 2024-05-28 PROCEDURE — 3044F HG A1C LEVEL LT 7.0%: CPT | Performed by: INTERNAL MEDICINE

## 2024-05-28 PROCEDURE — G2211 COMPLEX E/M VISIT ADD ON: HCPCS | Performed by: INTERNAL MEDICINE

## 2024-05-28 PROCEDURE — G8420 CALC BMI NORM PARAMETERS: HCPCS | Performed by: INTERNAL MEDICINE

## 2024-05-28 PROCEDURE — 1123F ACP DISCUSS/DSCN MKR DOCD: CPT | Performed by: INTERNAL MEDICINE

## 2024-05-28 PROCEDURE — 99214 OFFICE O/P EST MOD 30 MIN: CPT | Performed by: INTERNAL MEDICINE

## 2024-05-28 PROCEDURE — 1036F TOBACCO NON-USER: CPT | Performed by: INTERNAL MEDICINE

## 2024-05-28 PROCEDURE — 3075F SYST BP GE 130 - 139MM HG: CPT | Performed by: INTERNAL MEDICINE

## 2024-05-28 PROCEDURE — G8427 DOCREV CUR MEDS BY ELIG CLIN: HCPCS | Performed by: INTERNAL MEDICINE

## 2024-05-28 NOTE — PATIENT INSTRUCTIONS
1) Your Hemoglobin A1c (3 month test of blood sugar) is 6.2% which is still excellent off the lantus so it's fine to stay off this and just use the ozempic and synjardy.    2) ALT and AST are markers of liver function.  Your values are normal.    3) Your BUN (kidney test) was slightly high due to mild dehydration. Drink at least 4 8oz glasses of water everyday to stay hydrated to prevent your creatinine level from going higher.    4) Your cholesterol is well controlled.    5) Your urine protein is back to normal and blood pressure is controlled on the losartan so stay on this.

## 2024-05-28 NOTE — PROGRESS NOTES
Chief Complaint   Patient presents with    Diabetes     PCP and pharmacy confirmed     History of Present Illness: Harmeet Jade is a 83 y.o. male here for follow up of diabetes.  Weight down 1 lbs since last visit in 11/23.  Has been off the lantus since last visit and is just taking ozempic 0.5 mg weekly = 38 clicks of the 1 mg pen and synjardy 1 tab daily and fasting sugars are mostly in the 110-140 range.  Has checked a few after meal readings and they tend to be under 180.  Has been compliant with losartan 25 mg daily and crestor 5 mg 4x/week.  Not having any myalgias.  He is here with his wife today and his family will be headed to [facility] at the end of June to the same house they have gone to for 21 years.      Current Outpatient Medications   Medication Sig    rosuvastatin (CRESTOR) 5 MG tablet Take 1 tab 4x/week    losartan (COZAAR) 25 MG tablet Take 1 tab at bedtime. For blood pressure and kidney protection    ONETOUCH ULTRA strip USE AS DIRECTED TWICE A DAY E11.9    OZEMPIC, 1 MG/DOSE, 4 MG/3ML SOPN INJECT 1 MG BY SUBCUTANEOUS ROUTE EVERY SEVEN (7) DAYS    DULoxetine (CYMBALTA) 20 MG extended release capsule Take by mouth daily    famotidine (PEPCID) 40 MG tablet Take 1 tablet by mouth daily    Lancet Devices (LANCING DEVICE) MISC Monitor blood sugar twice daily. Diagnosis code: E11.8    acetaminophen (TYLENOL) 325 MG tablet Take by mouth every 4 hours as needed    Empagliflozin-metFORMIN HCl ER (SYNJARDY XR) 12.5-1000 MG TB24 Take 1 tab daily--written as 2 tabs daily to help with cost     No current facility-administered medications for this visit.     No Known Allergies    Review of Systems: PER HPI    Physical Examination:  Blood pressure 133/78, pulse 69, height 1.778 m (5' 10\"), weight 69.6 kg (153 lb 6.4 oz).  General: pleasant, no distress, good eye contact   Neck: no carotid bruits  Cardiovascular: regular, normal rate, nl s1 and s2, no m/r/g,   Respiratory: clear

## 2024-07-19 RX ORDER — EMPAGLIFLOZIN, METFORMIN HYDROCHLORIDE 12.5; 1 MG/1; MG/1
TABLET, EXTENDED RELEASE ORAL
Qty: 180 TABLET | Refills: 3 | Status: SHIPPED | OUTPATIENT
Start: 2024-07-19

## 2024-07-19 RX ORDER — SEMAGLUTIDE 1.34 MG/ML
INJECTION, SOLUTION SUBCUTANEOUS
Qty: 9 ML | Refills: 3 | Status: SHIPPED | OUTPATIENT
Start: 2024-07-19

## 2024-09-12 RX ORDER — BLOOD SUGAR DIAGNOSTIC
STRIP MISCELLANEOUS
Qty: 100 STRIP | Refills: 3 | Status: SHIPPED | OUTPATIENT
Start: 2024-09-12

## 2024-09-21 RX ORDER — LOSARTAN POTASSIUM 25 MG/1
TABLET ORAL
Qty: 90 TABLET | Refills: 3 | Status: SHIPPED | OUTPATIENT
Start: 2024-09-21

## 2024-09-23 RX ORDER — BLOOD SUGAR DIAGNOSTIC
STRIP MISCELLANEOUS
Qty: 100 STRIP | Refills: 3 | Status: SHIPPED | OUTPATIENT
Start: 2024-09-23

## 2024-11-04 DIAGNOSIS — E78.5 DYSLIPIDEMIA: ICD-10-CM

## 2024-11-04 DIAGNOSIS — E11.21 TYPE 2 DIABETES WITH NEPHROPATHY (HCC): ICD-10-CM

## 2024-11-04 DIAGNOSIS — Z79.4 LONG TERM (CURRENT) USE OF INSULIN (HCC): ICD-10-CM

## 2024-11-04 DIAGNOSIS — I10 ESSENTIAL (PRIMARY) HYPERTENSION: ICD-10-CM

## 2024-11-04 LAB
ANION GAP SERPL CALC-SCNC: 7 MMOL/L (ref 2–12)
BUN SERPL-MCNC: 24 MG/DL (ref 6–20)
BUN/CREAT SERPL: 17 (ref 12–20)
CALCIUM SERPL-MCNC: 10.3 MG/DL (ref 8.5–10.1)
CHLORIDE SERPL-SCNC: 104 MMOL/L (ref 97–108)
CO2 SERPL-SCNC: 28 MMOL/L (ref 21–32)
CREAT SERPL-MCNC: 1.43 MG/DL (ref 0.7–1.3)
EST. AVERAGE GLUCOSE BLD GHB EST-MCNC: 137 MG/DL
GLUCOSE SERPL-MCNC: 138 MG/DL (ref 65–100)
HBA1C MFR BLD: 6.4 % (ref 4–5.6)
POTASSIUM SERPL-SCNC: 4.8 MMOL/L (ref 3.5–5.1)
SODIUM SERPL-SCNC: 139 MMOL/L (ref 136–145)

## 2024-11-08 ENCOUNTER — OFFICE VISIT (OUTPATIENT)
Age: 84
End: 2024-11-08

## 2024-11-08 VITALS
HEART RATE: 80 BPM | DIASTOLIC BLOOD PRESSURE: 75 MMHG | SYSTOLIC BLOOD PRESSURE: 114 MMHG | HEIGHT: 70 IN | WEIGHT: 149.4 LBS | BODY MASS INDEX: 21.39 KG/M2

## 2024-11-08 DIAGNOSIS — E78.5 DYSLIPIDEMIA: ICD-10-CM

## 2024-11-08 DIAGNOSIS — I10 ESSENTIAL (PRIMARY) HYPERTENSION: ICD-10-CM

## 2024-11-08 DIAGNOSIS — E11.21 TYPE 2 DIABETES WITH NEPHROPATHY (HCC): Primary | ICD-10-CM

## 2024-11-08 NOTE — PROGRESS NOTES
Chief Complaint   Patient presents with    Diabetes     PCP and pharmacy confirmed     History of Present Illness: Harmeet Jade is a 84 y.o. male here for follow up of diabetes.  Weight down 4 lbs since last visit in 5/24.  His appetite is not as good as it used to be and has not been drinking enough water.  His wife is dealing with memory loss and he helps care for her meds and is doing the cooking.  Has had some numbness in his feet and we agreed to try a b-complex vitamin to see if this helps.  Compliant with ozempic 0.5 mg weekly and synjardy 1 tab daily and fasting sugars are in the 110-130s with a few in the 140-150s and rarely in the 160s.  Compliant with BP and lipid regimen.      Current Outpatient Medications   Medication Sig    ONETOUCH ULTRA strip Test once daily Dx Code: E11.21    losartan (COZAAR) 25 MG tablet TAKE 1 TABLET BY MOUTH EVERY DAY AT BEDTIME FOR BLOOD PRESSURE AND KIDNEY PROTECTION    OZEMPIC, 1 MG/DOSE, 4 MG/3ML SOPN sc injection INJECT 1 MG BY SUBCUTANEOUS ROUTE EVERY SEVEN (7) DAYS    SYNJARDY XR 12.5-1000 MG TB24 TAKE 2 TABLETS BY MOUTH ONCE DAILY--REPLACES INVOKAMET XR    rosuvastatin (CRESTOR) 5 MG tablet Take 1 tab 4x/week    DULoxetine (CYMBALTA) 20 MG extended release capsule Take by mouth daily    famotidine (PEPCID) 40 MG tablet Take 1 tablet by mouth daily    Lancet Devices (LANCING DEVICE) MISC Monitor blood sugar twice daily. Diagnosis code: E11.8    acetaminophen (TYLENOL) 325 MG tablet Take by mouth every 4 hours as needed     No current facility-administered medications for this visit.     No Known Allergies    Review of Systems: PER HPI    Physical Examination:  Blood pressure 114/75, pulse 80, height 1.778 m (5' 10\"), weight 67.8 kg (149 lb 6.4 oz).  General: pleasant, no distress, good eye contact   Neck: no carotid bruits  Cardiovascular: regular, normal rate, nl s1 and s2, no m/r/g,   Respiratory: clear bilaterally  Integumentary: no edema,   Psychiatric: normal

## 2024-11-08 NOTE — PATIENT INSTRUCTIONS
1) Your Hemoglobin A1c (3 month test of blood sugar) is 6.4% which is very good but lower than I need it to be.  Decrease the ozempic to 0.25 mg every 7 days, which is 19 clicks to help with weight and risk of dehydration.    2) BUN and creatinine are markers of kidney function.  Your creatinine (kidney test) was slightly high due to mild dehydration. Drink at least 4 8oz glasses of water everyday to stay hydrated to prevent your creatinine level from going higher.    3) You can try a Super B-complex vitamin with B6 and B12 one tab daily to see if this helps with any of the nerve pain symptoms you have been having.    4) Your blood pressure looks great.

## 2025-02-21 RX ORDER — ROSUVASTATIN CALCIUM 5 MG/1
TABLET, COATED ORAL
Qty: 50 TABLET | Refills: 3 | Status: SHIPPED | OUTPATIENT
Start: 2025-02-21

## 2025-05-12 ENCOUNTER — HOSPITAL ENCOUNTER (OUTPATIENT)
Facility: HOSPITAL | Age: 85
Discharge: HOME | End: 2025-05-15

## 2025-05-12 DIAGNOSIS — E78.5 DYSLIPIDEMIA: ICD-10-CM

## 2025-05-12 DIAGNOSIS — I10 ESSENTIAL (PRIMARY) HYPERTENSION: ICD-10-CM

## 2025-05-12 DIAGNOSIS — E11.21 TYPE 2 DIABETES WITH NEPHROPATHY (HCC): ICD-10-CM

## 2025-05-12 LAB
ALBUMIN SERPL-MCNC: 4.1 G/DL (ref 3.5–5)
ALBUMIN/GLOB SERPL: 1.2 (ref 1.1–2.2)
ALP SERPL-CCNC: 67 U/L (ref 45–117)
ALT SERPL-CCNC: 26 U/L (ref 12–78)
ANION GAP SERPL CALC-SCNC: 5 MMOL/L (ref 2–12)
AST SERPL-CCNC: 22 U/L (ref 15–37)
BILIRUB SERPL-MCNC: 0.7 MG/DL (ref 0.2–1)
BUN SERPL-MCNC: 22 MG/DL (ref 6–20)
BUN/CREAT SERPL: 19 (ref 12–20)
CALCIUM SERPL-MCNC: 9.8 MG/DL (ref 8.5–10.1)
CHLORIDE SERPL-SCNC: 106 MMOL/L (ref 97–108)
CHOLEST SERPL-MCNC: 158 MG/DL
CO2 SERPL-SCNC: 28 MMOL/L (ref 21–32)
CREAT SERPL-MCNC: 1.17 MG/DL (ref 0.7–1.3)
CREAT UR-MCNC: 85 MG/DL
EST. AVERAGE GLUCOSE BLD GHB EST-MCNC: 148 MG/DL
GLOBULIN SER CALC-MCNC: 3.4 G/DL (ref 2–4)
GLUCOSE SERPL-MCNC: 143 MG/DL (ref 65–100)
HBA1C MFR BLD: 6.8 % (ref 4–5.6)
HDLC SERPL-MCNC: 47 MG/DL
HDLC SERPL: 3.4 (ref 0–5)
LDLC SERPL CALC-MCNC: 96.8 MG/DL (ref 0–100)
MICROALBUMIN UR-MCNC: 1.97 MG/DL
MICROALBUMIN/CREAT UR-RTO: 23 MG/G (ref 0–30)
POTASSIUM SERPL-SCNC: 4.9 MMOL/L (ref 3.5–5.1)
PROT SERPL-MCNC: 7.5 G/DL (ref 6.4–8.2)
SODIUM SERPL-SCNC: 139 MMOL/L (ref 136–145)
TRIGL SERPL-MCNC: 71 MG/DL
VLDLC SERPL CALC-MCNC: 14.2 MG/DL

## 2025-05-15 ENCOUNTER — RESULTS FOLLOW-UP (OUTPATIENT)
Age: 85
End: 2025-05-15

## 2025-05-15 ENCOUNTER — OFFICE VISIT (OUTPATIENT)
Age: 85
End: 2025-05-15
Payer: MEDICARE

## 2025-05-15 VITALS
HEIGHT: 70 IN | WEIGHT: 156.2 LBS | SYSTOLIC BLOOD PRESSURE: 128 MMHG | BODY MASS INDEX: 22.36 KG/M2 | HEART RATE: 63 BPM | DIASTOLIC BLOOD PRESSURE: 70 MMHG

## 2025-05-15 DIAGNOSIS — E11.21 TYPE 2 DIABETES WITH NEPHROPATHY (HCC): Primary | ICD-10-CM

## 2025-05-15 DIAGNOSIS — E78.5 DYSLIPIDEMIA: ICD-10-CM

## 2025-05-15 DIAGNOSIS — I10 ESSENTIAL (PRIMARY) HYPERTENSION: ICD-10-CM

## 2025-05-15 PROCEDURE — 99214 OFFICE O/P EST MOD 30 MIN: CPT | Performed by: INTERNAL MEDICINE

## 2025-05-15 PROCEDURE — 1036F TOBACCO NON-USER: CPT | Performed by: INTERNAL MEDICINE

## 2025-05-15 PROCEDURE — G8427 DOCREV CUR MEDS BY ELIG CLIN: HCPCS | Performed by: INTERNAL MEDICINE

## 2025-05-15 PROCEDURE — 1126F AMNT PAIN NOTED NONE PRSNT: CPT | Performed by: INTERNAL MEDICINE

## 2025-05-15 PROCEDURE — 3078F DIAST BP <80 MM HG: CPT | Performed by: INTERNAL MEDICINE

## 2025-05-15 PROCEDURE — G2211 COMPLEX E/M VISIT ADD ON: HCPCS | Performed by: INTERNAL MEDICINE

## 2025-05-15 PROCEDURE — G8420 CALC BMI NORM PARAMETERS: HCPCS | Performed by: INTERNAL MEDICINE

## 2025-05-15 PROCEDURE — 1123F ACP DISCUSS/DSCN MKR DOCD: CPT | Performed by: INTERNAL MEDICINE

## 2025-05-15 PROCEDURE — 1159F MED LIST DOCD IN RCRD: CPT | Performed by: INTERNAL MEDICINE

## 2025-05-15 PROCEDURE — 3074F SYST BP LT 130 MM HG: CPT | Performed by: INTERNAL MEDICINE

## 2025-05-15 PROCEDURE — 3044F HG A1C LEVEL LT 7.0%: CPT | Performed by: INTERNAL MEDICINE

## 2025-05-15 PROCEDURE — 1160F RVW MEDS BY RX/DR IN RCRD: CPT | Performed by: INTERNAL MEDICINE

## 2025-05-15 RX ORDER — VITAMIN B COMPLEX
1 CAPSULE ORAL NIGHTLY
COMMUNITY

## 2025-05-15 NOTE — PROGRESS NOTES
Chief Complaint   Patient presents with    Diabetes     PCP and Pharmacy verified  Pt consents to [facility]     History of Present Illness: Harmeet Jade is a 84 y.o. male here for follow up of diabetes.  Weight up 7 lbs since last visit in 11/24.      History of Present Illness  The patient is an 84-year-old male here for a follow-up of diabetes.    He has been managing his diabetes with Ozempic 0.5 mg, administered as 38 clicks from a 1 mg pen (increased from 0.25 mg in 4/25 due to higher fasting sugars in the 140-160s), and Synjardy once daily. He has seen a reduction in his fasting blood glucose levels to the 110-130s but can still go up in the 140-180 range based on what he ate the night before. He acknowledges that his diet may be contributing to these elevated readings. For instance, his most recent meal consisted of sunshine eggs, a dish prepared with cream sauce, hard-boiled eggs, ambrocio, and fruit. He admits to consuming more fruit than other components of the meal.    He reports no edema in his lower extremities but expresses concern about potential issues with his feet. He has been taking Super B Complex nightly, which he believes has been beneficial. However, he occasionally experiences numbness in the arch of his foot when wearing shoes. He is currently on duloxetine, prescribed by Dr. Thornton, for mood management but will consult with him about increasing the dose to help with neuropathy.    Compliant with losartan and crestor.  Willing to f/u with PCP going forward.      Current Outpatient Medications   Medication Sig    b complex vitamins capsule Take 1 capsule by mouth at bedtime    rosuvastatin (CRESTOR) 5 MG tablet TAKE 1 TAB BY MOUTH 4 TIMES A WEEK    ONETOUCH ULTRA strip Test once daily Dx Code: E11.21    losartan (COZAAR) 25 MG tablet TAKE 1 TABLET BY MOUTH EVERY DAY AT BEDTIME FOR BLOOD PRESSURE AND KIDNEY PROTECTION    OZEMPIC, 1 MG/DOSE, 4 MG/3ML SOPN sc injection INJECT 1 MG BY SUBCUTANEOUS

## 2025-05-15 NOTE — PATIENT INSTRUCTIONS
1) Your Hemoglobin A1c (3 month test of blood sugar) remains well below goal under 8% so your ozempic and synjardy are perfect.    2) BUN and creatinine are markers of kidney function.  Your BUN is slightly high but improved and your creatinine is back to normal.    3) Your blood pressure and cholesterol and urine are all normal.    4) Ask Dr. Thornton about trying either 2 of the 20 mg duloxetine or increasing to 30 mg to help with mood and neuropathy symptoms.    5) Going forward I will have you just follow up with your PCP and if anything worsens in the future, I'm happy to see you back.  Feel free to send me a message through [employer] or call me at [phone] if you have any questions or concerns in the future.    Plan on making an appointment for November with your PCP.

## 2025-07-28 ENCOUNTER — HOSPITAL ENCOUNTER (EMERGENCY)
Facility: HOSPITAL | Age: 85
Discharge: HOME | End: 2025-07-29
Attending: EMERGENCY MEDICINE
Payer: MEDICARE

## 2025-07-28 DIAGNOSIS — K64.9 HEMORRHOIDS, UNSPECIFIED HEMORRHOID TYPE: ICD-10-CM

## 2025-07-28 DIAGNOSIS — E86.0 DEHYDRATION: ICD-10-CM

## 2025-07-28 DIAGNOSIS — K52.9 ACUTE GASTROENTERITIS: ICD-10-CM

## 2025-07-28 DIAGNOSIS — R55 NEAR SYNCOPE: Primary | ICD-10-CM

## 2025-07-28 DIAGNOSIS — N17.9 AKI (ACUTE KIDNEY INJURY): ICD-10-CM

## 2025-07-28 DIAGNOSIS — K92.2 LOWER GI BLEED: ICD-10-CM

## 2025-07-28 PROCEDURE — 96374 THER/PROPH/DIAG INJ IV PUSH: CPT

## 2025-07-28 PROCEDURE — 99285 EMERGENCY DEPT VISIT HI MDM: CPT

## 2025-07-28 PROCEDURE — 96361 HYDRATE IV INFUSION ADD-ON: CPT

## 2025-07-28 PROCEDURE — 96375 TX/PRO/DX INJ NEW DRUG ADDON: CPT

## 2025-07-29 ENCOUNTER — APPOINTMENT (OUTPATIENT)
Facility: HOSPITAL | Age: 85
End: 2025-07-29
Payer: MEDICARE

## 2025-07-29 VITALS
BODY MASS INDEX: 21.62 KG/M2 | HEIGHT: 70 IN | HEART RATE: 76 BPM | TEMPERATURE: 97.7 F | SYSTOLIC BLOOD PRESSURE: 121 MMHG | OXYGEN SATURATION: 99 % | WEIGHT: 151 LBS | DIASTOLIC BLOOD PRESSURE: 63 MMHG

## 2025-07-29 LAB
ABO + RH BLD: NORMAL
ALBUMIN SERPL-MCNC: 3.3 G/DL (ref 3.5–5)
ALBUMIN/GLOB SERPL: 1.2 (ref 1.1–2.2)
ALP SERPL-CCNC: 43 U/L (ref 45–117)
ALT SERPL-CCNC: 19 U/L (ref 12–78)
ANION GAP SERPL CALC-SCNC: 6 MMOL/L (ref 2–12)
APPEARANCE UR: CLEAR
AST SERPL-CCNC: 12 U/L (ref 15–37)
BACTERIA URNS QL MICRO: NEGATIVE /HPF
BASOPHILS # BLD: 0.05 K/UL (ref 0–0.1)
BASOPHILS NFR BLD: 0.5 % (ref 0–1)
BILIRUB SERPL-MCNC: 0.3 MG/DL (ref 0.2–1)
BILIRUB UR QL: NEGATIVE
BLOOD GROUP ANTIBODIES SERPL: NORMAL
BUN SERPL-MCNC: 32 MG/DL (ref 6–20)
BUN/CREAT SERPL: 24 (ref 12–20)
CALCIUM SERPL-MCNC: 9 MG/DL (ref 8.5–10.1)
CHLORIDE SERPL-SCNC: 107 MMOL/L (ref 97–108)
CO2 SERPL-SCNC: 25 MMOL/L (ref 21–32)
COLOR UR: ABNORMAL
COMMENT:: NORMAL
CREAT SERPL-MCNC: 1.33 MG/DL (ref 0.7–1.3)
DIFFERENTIAL METHOD BLD: ABNORMAL
EOSINOPHIL # BLD: 0.1 K/UL (ref 0–0.4)
EOSINOPHIL NFR BLD: 0.9 % (ref 0–7)
EPITH CASTS URNS QL MICRO: ABNORMAL /LPF
ERYTHROCYTE [DISTWIDTH] IN BLOOD BY AUTOMATED COUNT: 13.1 % (ref 11.5–14.5)
GLOBULIN SER CALC-MCNC: 2.7 G/DL (ref 2–4)
GLUCOSE SERPL-MCNC: 195 MG/DL (ref 65–100)
GLUCOSE UR STRIP.AUTO-MCNC: >1000 MG/DL
HCT VFR BLD AUTO: 30.1 % (ref 36.6–50.3)
HGB BLD-MCNC: 10.1 G/DL (ref 12.1–17)
HGB UR QL STRIP: NEGATIVE
HYALINE CASTS URNS QL MICRO: ABNORMAL /LPF (ref 0–2)
IMM GRANULOCYTES # BLD AUTO: 0.04 K/UL (ref 0–0.04)
IMM GRANULOCYTES NFR BLD AUTO: 0.4 % (ref 0–0.5)
KETONES UR QL STRIP.AUTO: 15 MG/DL
LEUKOCYTE ESTERASE UR QL STRIP.AUTO: NEGATIVE
LYMPHOCYTES # BLD: 1.83 K/UL (ref 0.8–3.5)
LYMPHOCYTES NFR BLD: 16.7 % (ref 12–49)
MAGNESIUM SERPL-MCNC: 1.8 MG/DL (ref 1.6–2.4)
MCH RBC QN AUTO: 31.1 PG (ref 26–34)
MCHC RBC AUTO-ENTMCNC: 33.6 G/DL (ref 30–36.5)
MCV RBC AUTO: 92.6 FL (ref 80–99)
MONOCYTES # BLD: 0.8 K/UL (ref 0–1)
MONOCYTES NFR BLD: 7.3 % (ref 5–13)
NEUTS SEG # BLD: 8.14 K/UL (ref 1.8–8)
NEUTS SEG NFR BLD: 74.2 % (ref 32–75)
NITRITE UR QL STRIP.AUTO: NEGATIVE
NRBC # BLD: 0 K/UL (ref 0–0.01)
NRBC BLD-RTO: 0 PER 100 WBC
NT PRO BNP: 59 PG/ML
PH UR STRIP: 5.5 (ref 5–8)
PLATELET # BLD AUTO: 150 K/UL (ref 150–400)
PMV BLD AUTO: 10.2 FL (ref 8.9–12.9)
POTASSIUM SERPL-SCNC: 4.1 MMOL/L (ref 3.5–5.1)
PROT SERPL-MCNC: 6 G/DL (ref 6.4–8.2)
PROT UR STRIP-MCNC: NEGATIVE MG/DL
RBC # BLD AUTO: 3.25 M/UL (ref 4.1–5.7)
RBC #/AREA URNS HPF: ABNORMAL /HPF (ref 0–5)
SODIUM SERPL-SCNC: 138 MMOL/L (ref 136–145)
SP GR UR REFRACTOMETRY: 1.02 (ref 1–1.03)
SPECIMEN EXP DATE BLD: NORMAL
SPECIMEN HOLD: NORMAL
TROPONIN I SERPL HS-MCNC: 4 NG/L (ref 0–76)
URINE CULTURE IF INDICATED: ABNORMAL
UROBILINOGEN UR QL STRIP.AUTO: 0.2 EU/DL (ref 0.2–1)
WBC # BLD AUTO: 11 K/UL (ref 4.1–11.1)
WBC URNS QL MICRO: ABNORMAL /HPF (ref 0–4)

## 2025-07-29 PROCEDURE — 36415 COLL VENOUS BLD VENIPUNCTURE: CPT

## 2025-07-29 PROCEDURE — 6360000004 HC RX CONTRAST MEDICATION: Performed by: EMERGENCY MEDICINE

## 2025-07-29 PROCEDURE — 71045 X-RAY EXAM CHEST 1 VIEW: CPT

## 2025-07-29 PROCEDURE — 86901 BLOOD TYPING SEROLOGIC RH(D): CPT

## 2025-07-29 PROCEDURE — 81001 URINALYSIS AUTO W/SCOPE: CPT

## 2025-07-29 PROCEDURE — 93005 ELECTROCARDIOGRAM TRACING: CPT

## 2025-07-29 PROCEDURE — 86900 BLOOD TYPING SEROLOGIC ABO: CPT

## 2025-07-29 PROCEDURE — 85025 COMPLETE CBC W/AUTO DIFF WBC: CPT

## 2025-07-29 PROCEDURE — 83735 ASSAY OF MAGNESIUM: CPT

## 2025-07-29 PROCEDURE — 84484 ASSAY OF TROPONIN QUANT: CPT

## 2025-07-29 PROCEDURE — 83880 ASSAY OF NATRIURETIC PEPTIDE: CPT

## 2025-07-29 PROCEDURE — 2500000003 HC RX 250 WO HCPCS: Performed by: EMERGENCY MEDICINE

## 2025-07-29 PROCEDURE — 2580000003 HC RX 258: Performed by: EMERGENCY MEDICINE

## 2025-07-29 PROCEDURE — 6370000000 HC RX 637 (ALT 250 FOR IP): Performed by: EMERGENCY MEDICINE

## 2025-07-29 PROCEDURE — 74174 CTA ABD&PLVS W/CONTRAST: CPT

## 2025-07-29 PROCEDURE — 86850 RBC ANTIBODY SCREEN: CPT

## 2025-07-29 PROCEDURE — 80053 COMPREHEN METABOLIC PANEL: CPT

## 2025-07-29 PROCEDURE — 6360000002 HC RX W HCPCS: Performed by: EMERGENCY MEDICINE

## 2025-07-29 RX ORDER — LOPERAMIDE HYDROCHLORIDE 2 MG/1
2 TABLET ORAL 4 TIMES DAILY PRN
Qty: 30 TABLET | Refills: 0 | Status: SHIPPED | OUTPATIENT
Start: 2025-07-29 | End: 2025-08-08

## 2025-07-29 RX ORDER — LOPERAMIDE HYDROCHLORIDE 2 MG/1
4 CAPSULE ORAL ONCE
Status: COMPLETED | OUTPATIENT
Start: 2025-07-29 | End: 2025-07-29

## 2025-07-29 RX ORDER — DICYCLOMINE HCL 20 MG
20 TABLET ORAL 4 TIMES DAILY PRN
Qty: 30 TABLET | Refills: 0 | Status: SHIPPED | OUTPATIENT
Start: 2025-07-29

## 2025-07-29 RX ORDER — 0.9 % SODIUM CHLORIDE 0.9 %
1000 INTRAVENOUS SOLUTION INTRAVENOUS ONCE
Status: COMPLETED | OUTPATIENT
Start: 2025-07-29 | End: 2025-07-29

## 2025-07-29 RX ORDER — IOPAMIDOL 755 MG/ML
100 INJECTION, SOLUTION INTRAVASCULAR
Status: COMPLETED | OUTPATIENT
Start: 2025-07-29 | End: 2025-07-29

## 2025-07-29 RX ORDER — ONDANSETRON 2 MG/ML
8 INJECTION INTRAMUSCULAR; INTRAVENOUS ONCE
Status: COMPLETED | OUTPATIENT
Start: 2025-07-29 | End: 2025-07-29

## 2025-07-29 RX ORDER — ONDANSETRON 4 MG/1
4 TABLET, ORALLY DISINTEGRATING ORAL 3 TIMES DAILY PRN
Qty: 21 TABLET | Refills: 0 | Status: SHIPPED | OUTPATIENT
Start: 2025-07-29

## 2025-07-29 RX ORDER — COCOA BUTTER, PHENYLEPHRINE HYDROCHLORIDE 2211; 6.25 MG/1; MG/1
1 SUPPOSITORY RECTAL DAILY
Qty: 7 SUPPOSITORY | Refills: 0 | Status: SHIPPED | OUTPATIENT
Start: 2025-07-29 | End: 2025-08-05

## 2025-07-29 RX ADMIN — IOPAMIDOL 100 ML: 755 INJECTION, SOLUTION INTRAVENOUS at 02:06

## 2025-07-29 RX ADMIN — SODIUM CHLORIDE 1000 ML: 0.9 INJECTION, SOLUTION INTRAVENOUS at 01:25

## 2025-07-29 RX ADMIN — PANTOPRAZOLE SODIUM 80 MG: 40 INJECTION, POWDER, FOR SOLUTION INTRAVENOUS at 01:09

## 2025-07-29 RX ADMIN — ONDANSETRON 8 MG: 2 INJECTION, SOLUTION INTRAMUSCULAR; INTRAVENOUS at 01:24

## 2025-07-29 RX ADMIN — LOPERAMIDE HYDROCHLORIDE 4 MG: 2 CAPSULE ORAL at 01:24

## 2025-07-29 ASSESSMENT — PAIN - FUNCTIONAL ASSESSMENT: PAIN_FUNCTIONAL_ASSESSMENT: NONE - DENIES PAIN

## 2025-07-29 ASSESSMENT — LIFESTYLE VARIABLES
HOW OFTEN DO YOU HAVE A DRINK CONTAINING ALCOHOL: 2-4 TIMES A MONTH
HOW MANY STANDARD DRINKS CONTAINING ALCOHOL DO YOU HAVE ON A TYPICAL DAY: PATIENT DOES NOT DRINK

## 2025-07-29 NOTE — ED NOTES
Called lab to verify that lab received patient's type and screen labs, they could not find them. Will send again.

## 2025-07-29 NOTE — ED NOTES
Patient's tubes were still in the tube station labeled and bagged, and tubed to the lab at this time.

## 2025-07-29 NOTE — DISCHARGE INSTRUCTIONS
It was a pleasure taking care of you in our Emergency Department today.  We know that when you come to [facility], you are entrusting us with your health, comfort, and safety.  Our physicians and nurses honor that trust, and truly appreciate the opportunity to care for you and your loved ones.    We also value your feedback.  If you receive a survey about your Emergency Department experience today, please fill it out.  We care about our patients' feedback, and we listen to what you have to say.  Thank you!       --- Dr. Yenni Ellis MD

## 2025-07-31 LAB
EKG ATRIAL RATE: 78 BPM
EKG DIAGNOSIS: NORMAL
EKG P AXIS: 81 DEGREES
EKG P-R INTERVAL: 166 MS
EKG Q-T INTERVAL: 420 MS
EKG QRS DURATION: 78 MS
EKG QTC CALCULATION (BAZETT): 478 MS
EKG R AXIS: 44 DEGREES
EKG T AXIS: 70 DEGREES
EKG VENTRICULAR RATE: 78 BPM

## 2025-08-01 RX ORDER — SEMAGLUTIDE 1.34 MG/ML
INJECTION, SOLUTION SUBCUTANEOUS
Qty: 9 ML | Refills: 0 | Status: ACTIVE | OUTPATIENT
Start: 2025-08-01

## 2025-08-01 RX ORDER — EMPAGLIFLOZIN, METFORMIN HYDROCHLORIDE 12.5; 1 MG/1; MG/1
TABLET, EXTENDED RELEASE ORAL
Qty: 180 TABLET | Refills: 0 | Status: ACTIVE | OUTPATIENT
Start: 2025-08-01

## 2025-08-12 NOTE — ED PROVIDER NOTES
baseline.   Skin:     General: Skin is warm and dry.      Capillary Refill: Capillary refill takes less than 2 seconds.      Coloration: Skin is not jaundiced or pale.      Findings: No bruising.   Neurological:      General: No focal deficit present.      Mental Status: He is alert and oriented to person, place, and time. Mental status is at baseline.         MEDICAL DECISION MAKING   I am the first and primary ED physician for this patient's ED visit today. Initial assessment performed. The patients presenting problems have been discussed, and they are in agreement with the care plan formulated and outlined with them.  I have encouraged them to ask questions as they arise throughout their visit.      ED Vitals:    Vitals:    07/29/25 0101 07/29/25 0211 07/29/25 0231 07/29/25 0241   BP: 133/75 127/77 116/70 121/63   Pulse: 68  79 76   Resp: 19  18 (!) 0   Temp:       TempSrc:       SpO2: 100%  99% 99%   Weight:       Height:           Cardiac and pulse ox monitor interpreted by me.  The cardiac monitor revealed normal sinus rhythm. Pulse ox with good pleth showed oxygenation on RA over 92%.   The cardiac and pulse ox monitor was ordered to monitor patient for signs of cardiac dysrhythmia or acute hypoxemia due to concern for possible dysrhythmia and/or hypoxemia based on patient's presentation, risk factors, underlying chronic comorbid conditions and/or metabolic abnormalities.     ECG interpretation by ED physician:   Rhythm: sinus; and regular. Rate: 78; normal intervals; ST/T wave: no st elevations or reciprocal depressions that meet STEMI criteria.  No definitive evidence of acute ischemia.  This and, if available, prior available ECGs have been viewed and interpreted by me personally.       ASSESSMENT, DDX and PLAN    ---Please refer to HPI above for details on patient's history and presenting symptoms.     In the ED patient is hemodynamically stable, afebrile, non-toxic appearance, does not appear in acute

## 2025-08-16 ASSESSMENT — ENCOUNTER SYMPTOMS
VOMITING: 1
DIARRHEA: 1
CONSTIPATION: 0
NAUSEA: 1
COUGH: 0
ABDOMINAL DISTENTION: 0
BACK PAIN: 0
BLOOD IN STOOL: 1
ABDOMINAL PAIN: 1
SHORTNESS OF BREATH: 0
COLOR CHANGE: 0

## 2025-08-23 ENCOUNTER — ANESTHESIA EVENT (OUTPATIENT)
Facility: HOSPITAL | Age: 85
End: 2025-08-23
Payer: MEDICARE

## 2025-08-25 ENCOUNTER — ANESTHESIA (OUTPATIENT)
Facility: HOSPITAL | Age: 85
End: 2025-08-25
Payer: MEDICARE

## 2025-08-25 ENCOUNTER — HOSPITAL ENCOUNTER (OUTPATIENT)
Facility: HOSPITAL | Age: 85
Setting detail: OUTPATIENT SURGERY
Discharge: HOME | End: 2025-08-25
Attending: INTERNAL MEDICINE | Admitting: INTERNAL MEDICINE
Payer: MEDICARE

## 2025-08-25 VITALS
HEART RATE: 75 BPM | OXYGEN SATURATION: 99 % | TEMPERATURE: 97.7 F | DIASTOLIC BLOOD PRESSURE: 90 MMHG | BODY MASS INDEX: 21.05 KG/M2 | SYSTOLIC BLOOD PRESSURE: 108 MMHG | RESPIRATION RATE: 21 BRPM | HEIGHT: 70 IN | WEIGHT: 147 LBS

## 2025-08-25 PROCEDURE — 7100000010 HC PHASE II RECOVERY - FIRST 15 MIN: Performed by: INTERNAL MEDICINE

## 2025-08-25 PROCEDURE — 6360000002 HC RX W HCPCS: Performed by: NURSE ANESTHETIST, CERTIFIED REGISTERED

## 2025-08-25 PROCEDURE — 3700000000 HC ANESTHESIA ATTENDED CARE: Performed by: INTERNAL MEDICINE

## 2025-08-25 PROCEDURE — 3600007502: Performed by: INTERNAL MEDICINE

## 2025-08-25 PROCEDURE — 2580000003 HC RX 258: Performed by: NURSE ANESTHETIST, CERTIFIED REGISTERED

## 2025-08-25 PROCEDURE — 7100000011 HC PHASE II RECOVERY - ADDTL 15 MIN: Performed by: INTERNAL MEDICINE

## 2025-08-25 PROCEDURE — 2709999900 HC NON-CHARGEABLE SUPPLY: Performed by: INTERNAL MEDICINE

## 2025-08-25 PROCEDURE — 88305 TISSUE EXAM BY PATHOLOGIST: CPT

## 2025-08-25 RX ORDER — SODIUM CHLORIDE 0.9 % (FLUSH) 0.9 %
5-40 SYRINGE (ML) INJECTION PRN
Status: DISCONTINUED | OUTPATIENT
Start: 2025-08-25 | End: 2025-08-25 | Stop reason: HOSPADM

## 2025-08-25 RX ORDER — LIDOCAINE HYDROCHLORIDE 20 MG/ML
INJECTION, SOLUTION EPIDURAL; INFILTRATION; INTRACAUDAL; PERINEURAL
Status: DISCONTINUED | OUTPATIENT
Start: 2025-08-25 | End: 2025-08-25 | Stop reason: SDUPTHER

## 2025-08-25 RX ORDER — SODIUM CHLORIDE 0.9 % (FLUSH) 0.9 %
5-40 SYRINGE (ML) INJECTION EVERY 12 HOURS SCHEDULED
Status: DISCONTINUED | OUTPATIENT
Start: 2025-08-25 | End: 2025-08-25 | Stop reason: HOSPADM

## 2025-08-25 RX ORDER — SODIUM CHLORIDE, SODIUM LACTATE, POTASSIUM CHLORIDE, CALCIUM CHLORIDE 600; 310; 30; 20 MG/100ML; MG/100ML; MG/100ML; MG/100ML
INJECTION, SOLUTION INTRAVENOUS
Status: DISCONTINUED | OUTPATIENT
Start: 2025-08-25 | End: 2025-08-25 | Stop reason: SDUPTHER

## 2025-08-25 RX ORDER — SODIUM CHLORIDE 9 MG/ML
INJECTION, SOLUTION INTRAVENOUS PRN
Status: DISCONTINUED | OUTPATIENT
Start: 2025-08-25 | End: 2025-08-25 | Stop reason: HOSPADM

## 2025-08-25 RX ADMIN — SODIUM CHLORIDE, POTASSIUM CHLORIDE, SODIUM LACTATE AND CALCIUM CHLORIDE: 600; 310; 30; 20 INJECTION, SOLUTION INTRAVENOUS at 11:08

## 2025-08-25 RX ADMIN — PROPOFOL 50 MG: 10 INJECTION, EMULSION INTRAVENOUS at 11:25

## 2025-08-25 RX ADMIN — LIDOCAINE HYDROCHLORIDE 100 MG: 20 INJECTION, SOLUTION EPIDURAL; INFILTRATION; INTRACAUDAL; PERINEURAL at 11:25

## 2025-08-25 RX ADMIN — PROPOFOL 20 MG: 10 INJECTION, EMULSION INTRAVENOUS at 11:28

## 2025-08-25 ASSESSMENT — PAIN - FUNCTIONAL ASSESSMENT: PAIN_FUNCTIONAL_ASSESSMENT: 0-10

## (undated) DEVICE — CONTAINER SPEC 20 ML LID NEUT BUFF FORMALIN 10 % POLYPR STS

## (undated) DEVICE — CATHETER IV 20GA L1.16IN OD1.0414-1.1176MM ID0.762-0.8382MM

## (undated) DEVICE — Device: Brand: MEDEX

## (undated) DEVICE — NEEDLE HYPO 18GA L1.5IN PNK S STL HUB POLYPR SHLD REG BVL

## (undated) DEVICE — IV START KIT WITH SECUREMENT DEVICES

## (undated) DEVICE — TOWEL 4 PLY TISS 19X30 SUE WHT

## (undated) DEVICE — Z DISCONTINUED PER MEDLINE LINE GAS SAMPLING O2/CO2 LNG AD 13 FT NSL W/ TBNG FILTERLINE

## (undated) DEVICE — Device

## (undated) DEVICE — SET ADMIN 16ML TBNG L100IN 2 Y INJ SITE IV PIGGY BK DISP

## (undated) DEVICE — BLOCK BITE ENDOSCP AD 21 MM W/ DIL BLU LF DISP

## (undated) DEVICE — CATH IV AUTOGRD BC BLU 22GA 25 -- INSYTE

## (undated) DEVICE — BAG SPEC BIOHZRD 10 X 10 IN --

## (undated) DEVICE — BASIN EMESIS 500CC ROSE 250/CS 60/PLT: Brand: MEDEGEN MEDICAL PRODUCTS, LLC

## (undated) DEVICE — NEONATAL-ADULT SPO2 SENSOR: Brand: NELLCOR

## (undated) DEVICE — SYR 3ML LL TIP 1/10ML GRAD --

## (undated) DEVICE — MEDI-VAC YANK SUCT HNDL W/TPRD BULBOUS TIP: Brand: CARDINAL HEALTH

## (undated) DEVICE — FORCEPS BX L160CM DIA8MM GRSP DISECT CUP TIP NONLOCKING ROT

## (undated) DEVICE — 1200 GUARD II KIT W/5MM TUBE W/O VAC TUBE: Brand: GUARDIAN

## (undated) DEVICE — SOLIDIFIER MEDC 1200ML -- CONVERT TO 356117

## (undated) DEVICE — KENDALL RADIOLUCENT FOAM MONITORING ELECTRODE RECTANGULAR SHAPE: Brand: KENDALL

## (undated) DEVICE — CUFF ADULT 1 PC 1 VINYL DISP --

## (undated) DEVICE — (D)SYR 10ML 1/5ML GRAD NSAF -- PKGING CHANGE USE ITEM 338027